# Patient Record
Sex: FEMALE | Race: WHITE | NOT HISPANIC OR LATINO | Employment: FULL TIME | URBAN - METROPOLITAN AREA
[De-identification: names, ages, dates, MRNs, and addresses within clinical notes are randomized per-mention and may not be internally consistent; named-entity substitution may affect disease eponyms.]

---

## 2017-03-10 ENCOUNTER — COMMUNICATION - HEALTHEAST (OUTPATIENT)
Dept: FAMILY MEDICINE | Facility: CLINIC | Age: 42
End: 2017-03-10

## 2017-03-10 DIAGNOSIS — F32.5 MAJOR DEPRESSION IN REMISSION (H): ICD-10-CM

## 2017-03-18 ENCOUNTER — OFFICE VISIT - HEALTHEAST (OUTPATIENT)
Dept: FAMILY MEDICINE | Facility: CLINIC | Age: 42
End: 2017-03-18

## 2017-03-18 DIAGNOSIS — J01.90 ACUTE SINUS INFECTION: ICD-10-CM

## 2017-06-07 ENCOUNTER — COMMUNICATION - HEALTHEAST (OUTPATIENT)
Dept: FAMILY MEDICINE | Facility: CLINIC | Age: 42
End: 2017-06-07

## 2017-06-07 DIAGNOSIS — F32.5 MAJOR DEPRESSION IN REMISSION (H): ICD-10-CM

## 2017-06-12 ENCOUNTER — COMMUNICATION - HEALTHEAST (OUTPATIENT)
Dept: FAMILY MEDICINE | Facility: CLINIC | Age: 42
End: 2017-06-12

## 2017-07-17 ENCOUNTER — OFFICE VISIT - HEALTHEAST (OUTPATIENT)
Dept: FAMILY MEDICINE | Facility: CLINIC | Age: 42
End: 2017-07-17

## 2017-07-17 DIAGNOSIS — F32.5 MAJOR DEPRESSION IN REMISSION (H): ICD-10-CM

## 2017-07-17 DIAGNOSIS — H92.03 EAR PAIN, BILATERAL: ICD-10-CM

## 2017-07-17 DIAGNOSIS — J01.90 ACUTE SINUSITIS: ICD-10-CM

## 2017-07-17 DIAGNOSIS — H69.90 EUSTACHIAN TUBE DYSFUNCTION: ICD-10-CM

## 2017-09-15 ENCOUNTER — OFFICE VISIT - HEALTHEAST (OUTPATIENT)
Dept: OTOLARYNGOLOGY | Facility: CLINIC | Age: 42
End: 2017-09-15

## 2017-09-15 ENCOUNTER — OFFICE VISIT - HEALTHEAST (OUTPATIENT)
Dept: AUDIOLOGY | Facility: CLINIC | Age: 42
End: 2017-09-15

## 2017-09-15 DIAGNOSIS — T70.0XXA BAROTRAUMA, OTIC, INITIAL ENCOUNTER: ICD-10-CM

## 2017-09-15 DIAGNOSIS — H92.03 OTALGIA, BILATERAL: ICD-10-CM

## 2018-06-06 ENCOUNTER — RECORDS - HEALTHEAST (OUTPATIENT)
Dept: ADMINISTRATIVE | Facility: OTHER | Age: 43
End: 2018-06-06

## 2018-06-18 ENCOUNTER — RECORDS - HEALTHEAST (OUTPATIENT)
Dept: ADMINISTRATIVE | Facility: OTHER | Age: 43
End: 2018-06-18

## 2018-06-22 ENCOUNTER — OFFICE VISIT - HEALTHEAST (OUTPATIENT)
Dept: FAMILY MEDICINE | Facility: CLINIC | Age: 43
End: 2018-06-22

## 2018-06-22 DIAGNOSIS — F32.5 MAJOR DEPRESSION IN REMISSION (H): ICD-10-CM

## 2018-06-22 ASSESSMENT — MIFFLIN-ST. JEOR: SCORE: 1255.75

## 2019-01-21 ENCOUNTER — OFFICE VISIT - HEALTHEAST (OUTPATIENT)
Dept: FAMILY MEDICINE | Facility: CLINIC | Age: 44
End: 2019-01-21

## 2019-01-21 DIAGNOSIS — S06.0X0A CONCUSSION WITHOUT LOSS OF CONSCIOUSNESS, INITIAL ENCOUNTER: ICD-10-CM

## 2019-01-21 DIAGNOSIS — S13.4XXA WHIPLASH INJURIES, INITIAL ENCOUNTER: ICD-10-CM

## 2019-01-21 DIAGNOSIS — S13.9XXA NECK SPRAIN, INITIAL ENCOUNTER: ICD-10-CM

## 2019-01-21 RX ORDER — ACETAMINOPHEN 500 MG
1000 TABLET ORAL EVERY 6 HOURS PRN
Qty: 100 TABLET | Refills: 2 | Status: SHIPPED | OUTPATIENT
Start: 2019-01-21 | End: 2022-12-14

## 2019-02-07 ENCOUNTER — RECORDS - HEALTHEAST (OUTPATIENT)
Dept: ADMINISTRATIVE | Facility: OTHER | Age: 44
End: 2019-02-07

## 2019-02-11 ENCOUNTER — RECORDS - HEALTHEAST (OUTPATIENT)
Dept: ADMINISTRATIVE | Facility: OTHER | Age: 44
End: 2019-02-11

## 2019-04-11 ENCOUNTER — OFFICE VISIT - HEALTHEAST (OUTPATIENT)
Dept: FAMILY MEDICINE | Facility: CLINIC | Age: 44
End: 2019-04-11

## 2019-04-11 DIAGNOSIS — R09.82 POST-NASAL DRIP: ICD-10-CM

## 2019-04-11 DIAGNOSIS — H69.92 DYSFUNCTION OF LEFT EUSTACHIAN TUBE: ICD-10-CM

## 2019-04-19 ENCOUNTER — HOSPITAL ENCOUNTER (OUTPATIENT)
Dept: CT IMAGING | Facility: HOSPITAL | Age: 44
Discharge: HOME | End: 2019-04-19
Attending: FAMILY MEDICINE

## 2019-04-19 DIAGNOSIS — H69.92 DYSFUNCTION OF LEFT EUSTACHIAN TUBE: ICD-10-CM

## 2019-04-19 DIAGNOSIS — R09.82 POST-NASAL DRIP: ICD-10-CM

## 2019-05-10 ENCOUNTER — AMBULATORY - HEALTHEAST (OUTPATIENT)
Dept: MULTI SPECIALTY CLINIC | Facility: CLINIC | Age: 44
End: 2019-05-10

## 2019-05-10 LAB — PAP SMEAR - HIM PATIENT REPORTED: NORMAL

## 2019-05-21 ENCOUNTER — OFFICE VISIT - HEALTHEAST (OUTPATIENT)
Dept: AUDIOLOGY | Facility: CLINIC | Age: 44
End: 2019-05-21

## 2019-05-21 ENCOUNTER — OFFICE VISIT - HEALTHEAST (OUTPATIENT)
Dept: OTOLARYNGOLOGY | Facility: CLINIC | Age: 44
End: 2019-05-21

## 2019-05-21 DIAGNOSIS — H92.02 OTALGIA, LEFT: ICD-10-CM

## 2019-05-21 DIAGNOSIS — H92.02 LEFT EAR PAIN: ICD-10-CM

## 2019-05-21 DIAGNOSIS — H93.8X2 SENSATION OF FULLNESS IN LEFT EAR: ICD-10-CM

## 2019-06-30 ENCOUNTER — COMMUNICATION - HEALTHEAST (OUTPATIENT)
Dept: FAMILY MEDICINE | Facility: CLINIC | Age: 44
End: 2019-06-30

## 2019-06-30 DIAGNOSIS — F32.5 MAJOR DEPRESSION IN REMISSION (H): ICD-10-CM

## 2019-07-17 ENCOUNTER — COMMUNICATION - HEALTHEAST (OUTPATIENT)
Dept: SCHEDULING | Facility: CLINIC | Age: 44
End: 2019-07-17

## 2019-07-17 DIAGNOSIS — F32.5 MAJOR DEPRESSION IN REMISSION (H): ICD-10-CM

## 2019-07-24 ENCOUNTER — OFFICE VISIT - HEALTHEAST (OUTPATIENT)
Dept: FAMILY MEDICINE | Facility: CLINIC | Age: 44
End: 2019-07-24

## 2019-07-24 DIAGNOSIS — H69.93 DYSFUNCTION OF BOTH EUSTACHIAN TUBES: ICD-10-CM

## 2019-07-24 DIAGNOSIS — F32.5 MAJOR DEPRESSION IN REMISSION (H): ICD-10-CM

## 2019-07-24 DIAGNOSIS — N93.9 ABNORMAL UTERINE BLEEDING (AUB): ICD-10-CM

## 2019-07-24 ASSESSMENT — MIFFLIN-ST. JEOR: SCORE: 1291.02

## 2019-12-05 ENCOUNTER — OFFICE VISIT - HEALTHEAST (OUTPATIENT)
Dept: FAMILY MEDICINE | Facility: CLINIC | Age: 44
End: 2019-12-05

## 2019-12-05 DIAGNOSIS — J01.10 ACUTE NON-RECURRENT FRONTAL SINUSITIS: ICD-10-CM

## 2019-12-05 DIAGNOSIS — Z23 ENCOUNTER FOR ADMINISTRATION OF VACCINE: ICD-10-CM

## 2019-12-20 ENCOUNTER — COMMUNICATION - HEALTHEAST (OUTPATIENT)
Dept: FAMILY MEDICINE | Facility: CLINIC | Age: 44
End: 2019-12-20

## 2019-12-20 ENCOUNTER — COMMUNICATION - HEALTHEAST (OUTPATIENT)
Dept: SCHEDULING | Facility: CLINIC | Age: 44
End: 2019-12-20

## 2019-12-30 ENCOUNTER — OFFICE VISIT - HEALTHEAST (OUTPATIENT)
Dept: AUDIOLOGY | Facility: CLINIC | Age: 44
End: 2019-12-30

## 2019-12-30 ENCOUNTER — OFFICE VISIT - HEALTHEAST (OUTPATIENT)
Dept: OTOLARYNGOLOGY | Facility: CLINIC | Age: 44
End: 2019-12-30

## 2019-12-30 DIAGNOSIS — H92.03 OTALGIA OF BOTH EARS: ICD-10-CM

## 2019-12-30 DIAGNOSIS — G44.209 TENSION HEADACHE: ICD-10-CM

## 2020-06-17 ENCOUNTER — COMMUNICATION - HEALTHEAST (OUTPATIENT)
Dept: FAMILY MEDICINE | Facility: CLINIC | Age: 45
End: 2020-06-17

## 2020-06-17 DIAGNOSIS — R09.82 POST-NASAL DRIP: ICD-10-CM

## 2020-06-17 DIAGNOSIS — H69.92 DYSFUNCTION OF LEFT EUSTACHIAN TUBE: ICD-10-CM

## 2020-07-07 ENCOUNTER — COMMUNICATION - HEALTHEAST (OUTPATIENT)
Dept: FAMILY MEDICINE | Facility: CLINIC | Age: 45
End: 2020-07-07

## 2020-07-07 DIAGNOSIS — F32.5 MAJOR DEPRESSION IN REMISSION (H): ICD-10-CM

## 2020-12-11 ENCOUNTER — OFFICE VISIT - HEALTHEAST (OUTPATIENT)
Dept: FAMILY MEDICINE | Facility: CLINIC | Age: 45
End: 2020-12-11

## 2020-12-11 DIAGNOSIS — F33.41 RECURRENT MAJOR DEPRESSIVE DISORDER, IN PARTIAL REMISSION (H): ICD-10-CM

## 2020-12-11 DIAGNOSIS — R09.82 POST-NASAL DRIP: ICD-10-CM

## 2020-12-11 DIAGNOSIS — R00.2 PALPITATIONS: ICD-10-CM

## 2020-12-11 LAB
ALBUMIN SERPL-MCNC: 3.9 G/DL (ref 3.5–5)
ALP SERPL-CCNC: 48 U/L (ref 45–120)
ALT SERPL W P-5'-P-CCNC: 15 U/L (ref 0–45)
ANION GAP SERPL CALCULATED.3IONS-SCNC: 10 MMOL/L (ref 5–18)
AST SERPL W P-5'-P-CCNC: 31 U/L (ref 0–40)
ATRIAL RATE - MUSE: 60 BPM
BILIRUB SERPL-MCNC: 0.7 MG/DL (ref 0–1)
BUN SERPL-MCNC: 10 MG/DL (ref 8–22)
CALCIUM SERPL-MCNC: 8.7 MG/DL (ref 8.5–10.5)
CHLORIDE BLD-SCNC: 105 MMOL/L (ref 98–107)
CO2 SERPL-SCNC: 24 MMOL/L (ref 22–31)
CREAT SERPL-MCNC: 0.8 MG/DL (ref 0.6–1.1)
DIASTOLIC BLOOD PRESSURE - MUSE: NORMAL
ERYTHROCYTE [DISTWIDTH] IN BLOOD BY AUTOMATED COUNT: 12.7 % (ref 11–14.5)
GFR SERPL CREATININE-BSD FRML MDRD: >60 ML/MIN/1.73M2
GLUCOSE BLD-MCNC: 85 MG/DL (ref 70–125)
HCT VFR BLD AUTO: 40.8 % (ref 35–47)
HGB BLD-MCNC: 13.5 G/DL (ref 12–16)
INTERPRETATION ECG - MUSE: NORMAL
MCH RBC QN AUTO: 31.2 PG (ref 27–34)
MCHC RBC AUTO-ENTMCNC: 33.1 G/DL (ref 32–36)
MCV RBC AUTO: 94 FL (ref 80–100)
P AXIS - MUSE: 60 DEGREES
PLATELET # BLD AUTO: 249 THOU/UL (ref 140–440)
PMV BLD AUTO: 9.3 FL (ref 8.5–12.5)
POTASSIUM BLD-SCNC: 4.4 MMOL/L (ref 3.5–5)
PR INTERVAL - MUSE: 164 MS
PROT SERPL-MCNC: 7.1 G/DL (ref 6–8)
QRS DURATION - MUSE: 82 MS
QT - MUSE: 456 MS
QTC - MUSE: 456 MS
R AXIS - MUSE: -4 DEGREES
RBC # BLD AUTO: 4.33 MILL/UL (ref 3.8–5.4)
SODIUM SERPL-SCNC: 139 MMOL/L (ref 136–145)
SYSTOLIC BLOOD PRESSURE - MUSE: NORMAL
T AXIS - MUSE: 18 DEGREES
TSH SERPL DL<=0.005 MIU/L-ACNC: 2.87 UIU/ML (ref 0.3–5)
VENTRICULAR RATE- MUSE: 60 BPM
WBC: 6 THOU/UL (ref 4–11)

## 2020-12-11 RX ORDER — SERTRALINE HYDROCHLORIDE 100 MG/1
100 TABLET, FILM COATED ORAL DAILY
Qty: 90 TABLET | Refills: 3 | Status: SHIPPED | OUTPATIENT
Start: 2020-12-11 | End: 2022-03-21

## 2020-12-11 ASSESSMENT — ANXIETY QUESTIONNAIRES
7. FEELING AFRAID AS IF SOMETHING AWFUL MIGHT HAPPEN: SEVERAL DAYS
5. BEING SO RESTLESS THAT IT IS HARD TO SIT STILL: NOT AT ALL
6. BECOMING EASILY ANNOYED OR IRRITABLE: NOT AT ALL
GAD7 TOTAL SCORE: 6
2. NOT BEING ABLE TO STOP OR CONTROL WORRYING: SEVERAL DAYS
3. WORRYING TOO MUCH ABOUT DIFFERENT THINGS: SEVERAL DAYS
1. FEELING NERVOUS, ANXIOUS, OR ON EDGE: MORE THAN HALF THE DAYS
IF YOU CHECKED OFF ANY PROBLEMS ON THIS QUESTIONNAIRE, HOW DIFFICULT HAVE THESE PROBLEMS MADE IT FOR YOU TO DO YOUR WORK, TAKE CARE OF THINGS AT HOME, OR GET ALONG WITH OTHER PEOPLE: SOMEWHAT DIFFICULT
4. TROUBLE RELAXING: SEVERAL DAYS

## 2020-12-11 ASSESSMENT — PATIENT HEALTH QUESTIONNAIRE - PHQ9: SUM OF ALL RESPONSES TO PHQ QUESTIONS 1-9: 2

## 2020-12-11 ASSESSMENT — MIFFLIN-ST. JEOR: SCORE: 1358.49

## 2020-12-11 NOTE — ASSESSMENT & PLAN NOTE
Increase sertraline to 100 mg daily. Elimination of alcohol use also recommended given depressive effects and potential effects on palpitations. Once mood well controlled limited use may be acceptable.

## 2020-12-14 LAB
25(OH)D3 SERPL-MCNC: 54.8 NG/ML (ref 30–80)
25(OH)D3 SERPL-MCNC: 54.8 NG/ML (ref 30–80)

## 2020-12-18 ENCOUNTER — OFFICE VISIT - HEALTHEAST (OUTPATIENT)
Dept: FAMILY MEDICINE | Facility: CLINIC | Age: 45
End: 2020-12-18

## 2020-12-18 ENCOUNTER — COMMUNICATION - HEALTHEAST (OUTPATIENT)
Dept: FAMILY MEDICINE | Facility: CLINIC | Age: 45
End: 2020-12-18

## 2020-12-18 DIAGNOSIS — F32.5 MAJOR DEPRESSION IN REMISSION (H): ICD-10-CM

## 2020-12-18 DIAGNOSIS — F33.41 RECURRENT MAJOR DEPRESSIVE DISORDER, IN PARTIAL REMISSION (H): ICD-10-CM

## 2020-12-18 DIAGNOSIS — F41.1 GENERALIZED ANXIETY DISORDER: ICD-10-CM

## 2020-12-18 DIAGNOSIS — M26.609 TMJ (TEMPOROMANDIBULAR JOINT SYNDROME): ICD-10-CM

## 2020-12-18 ASSESSMENT — MIFFLIN-ST. JEOR: SCORE: 1360.48

## 2020-12-18 NOTE — ASSESSMENT & PLAN NOTE
Continue physical therapy. Continued limited use of Norflex is reasonable. We will limit to 30 tabs every 3 months.

## 2020-12-18 NOTE — ASSESSMENT & PLAN NOTE
Patient already seeing improving with recent sertraline dose increase. She has also reduced alcohol intake. Continue current treatment.

## 2020-12-23 RX ORDER — SERTRALINE HYDROCHLORIDE 100 MG/1
100 TABLET, FILM COATED ORAL DAILY
Qty: 90 TABLET | Refills: 3 | Status: SHIPPED | OUTPATIENT
Start: 2020-12-23 | End: 2021-10-08

## 2021-02-10 ENCOUNTER — COMMUNICATION - HEALTHEAST (OUTPATIENT)
Dept: FAMILY MEDICINE | Facility: CLINIC | Age: 46
End: 2021-02-10

## 2021-02-10 DIAGNOSIS — R09.82 POST-NASAL DRIP: ICD-10-CM

## 2021-05-08 ENCOUNTER — RECORDS - HEALTHEAST (OUTPATIENT)
Dept: ADMINISTRATIVE | Facility: OTHER | Age: 46
End: 2021-05-08

## 2021-05-27 ASSESSMENT — PATIENT HEALTH QUESTIONNAIRE - PHQ9: SUM OF ALL RESPONSES TO PHQ QUESTIONS 1-9: 2

## 2021-05-27 NOTE — PROGRESS NOTES
ASSESSMENT/PLAN:  1. Dysfunction of left eustachian tube  43-year-old female with acute exacerbation of some long-standing issues.  Certainly at face value the left ear and sinus pain seems consistent with eustachian tube dysfunction.  However this is been recurrent for her and she also describes some nasal drainage always on the left.  I would like to order a CT of the sinus as well as get her started on Flonase.  Because her ear pain is been so severe this is been ongoing for many years, I will ask her to see ENT for further evaluation.  - fluticasone propionate (FLONASE ALLERGY RELIEF) 50 mcg/actuation nasal spray; 2 sprays into each nostril daily.  Dispense: 16 g; Refill: 12  - CT Sinuses Without Contrast; Future  - Ambulatory referral to ENT    2. Post-nasal drip  - fluticasone propionate (FLONASE ALLERGY RELIEF) 50 mcg/actuation nasal spray; 2 sprays into each nostril daily.  Dispense: 16 g; Refill: 12  - CT Sinuses Without Contrast; Future      Patient Instructions   CT Sinus ordered; someone will call you to help you schedule this.    Try Flonase nasal spray twice daily.    Consider taking naproxen twice daily for the next 3 days.  - Make sure you take this with food.    Referral for ENT is placed; someone will call you to help you schedule this.       Orders Placed This Encounter   Procedures     CT Sinuses Without Contrast     Standing Status:   Future     Standing Expiration Date:   4/11/2020     Order Specific Question:   Reason for Exam (Describe Symptoms):     Answer:   left sinus pain, chronic     Order Specific Question:   Is the patient pregnant?     Answer:   No     Order Specific Question:   Can the procedure be changed per Radiologist protocol?     Answer:   Yes     Order Specific Question:   If this is a diagnostic procedure, have the patient's age and recent imaging history been considered?     Answer:   Yes     Ambulatory referral to ENT     Referral Priority:   Routine     Referral Type:    Consultation     Referral Reason:   Evaluation and Treatment     Requested Specialty:   Otolaryngology     Number of Visits Requested:   1     Medications Discontinued During This Encounter   Medication Reason     cyclobenzaprine (FLEXERIL) 5 MG tablet Therapy completed       Return in about 6 months (around 10/11/2019) for annual physical with PCP, or sooner as needed.    CHIEF COMPLAINT;  Chief Complaint   Patient presents with     Ear Pain     Left ear with drainage       HISTORY OF PRESENT ILLNESS:  Amee is a 43 y.o. female presenting to the clinic today for ear pain. She woke up yesterday and threw up around 4 AM. She stayed home from work yesterday because she was not feeling well. She started to have extreme left-sided jaw pain at 3 PM yesterday. The discomfort would improve if she touched her left ear. She took naproxen and found some relief. She is still having quite a bit of pressure in her left jaw and ear. She has had some nasal drainage for the past month. She does have some postnasal drip when she sleeps. She gets quite a bit of dizziness and ear pain when she flies. Of note, she has previously seen Dr. Aranda with ENT for ear pain and dizziness with flights.     REVIEW OF SYSTEMS:  She wears glasses. All other systems are negative.    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 She went to [place] in the beginning of March.     TOBACCO USE:  Social History     Tobacco Use   Smoking Status Never Smoker   Smokeless Tobacco Never Used       VITALS:  Vitals:    04/11/19 1125 04/11/19 1127   BP: 112/70    Patient Site: Right Arm    Patient Position: Sitting    Cuff Size: Adult Regular    Pulse: (!) 57 (!) 59   SpO2: 99%    Weight: 140 lb 8 oz (63.7 kg)      Wt Readings from Last 3 Encounters:   04/11/19 140 lb 8 oz (63.7 kg)   01/21/19 143 lb (64.9 kg)   06/22/18 135 lb 1.6 oz (61.3 kg)     Body mass index is 23.74 kg/m .    PHYSICAL EXAM:  GENERAL APPEARANCE:  Alert, cooperative, no distress, appears stated age  HEAD: Normocephalic, without obvious abnormality, atraumatic  EYES:  PERRL, conjunctiva/corneas clear, EOM's intact, fundi     benign, both eyes       EARS: Normal TM's and external ear canals, both ears  NOSE:  Nares normal, septum midline, mucosa normal, no drainage or sinus tenderness  THROAT: Lips, mucosa, and tongue normal; teeth and gums normal  NEUROLOGIC: CNII-XII intact.     RECENT RESULTS  No results found for this or any previous visit (from the past 48 hour(s)).    ADDITIONAL HISTORY SUMMARIZED (2): None.  DECISION TO OBTAIN EXTRA INFORMATION (1): None.  RADIOLOGY TESTS (1): CT Sinuses ordered.  LABS (1): None.  MEDICINE TESTS (1): None.  INDEPENDENT REVIEW (2 each): None.    The visit lasted a total of 12 minutes face to face with the patient. Over 50% of the time was spent counseling and educating the patient about jaw pain.    IAnabel, am scribing for and in the presence of, Dr. Burnett.    I, Dr. Burnett, personally performed the services described in this documentation, as scribed by Anabel Lujan in my presence, and it is both accurate and complete.    Dragon dictation was used for this note.  Speech recognition errors are a possibility.    MEDICATIONS:  Current Outpatient Medications   Medication Sig Dispense Refill     acetaminophen (TYLENOL EXTRA STRENGTH) 500 MG tablet Take 2 tablets (1,000 mg total) by mouth every 6 (six) hours as needed for pain. 100 tablet 2     naproxen (NAPROSYN) 500 MG tablet Take 1 tablet (500 mg total) by mouth 2 (two) times a day with meals. 60 tablet 2     sertraline (ZOLOFT) 50 MG tablet TAKE ONE TABLET BY MOUTH ONE TIME DAILY 90 tablet 3     fluticasone propionate (FLONASE ALLERGY RELIEF) 50 mcg/actuation nasal spray 2 sprays into each nostril daily. 16 g 12     No current facility-administered medications for this visit.        Total data points: 1

## 2021-05-27 NOTE — PATIENT INSTRUCTIONS - HE
CT Sinus ordered; someone will call you to help you schedule this.    Try Flonase nasal spray twice daily.    Consider taking naproxen twice daily for the next 3 days.  - Make sure you take this with food.    Referral for ENT is placed; someone will call you to help you schedule this.

## 2021-05-28 ASSESSMENT — ANXIETY QUESTIONNAIRES: GAD7 TOTAL SCORE: 6

## 2021-05-29 NOTE — PROGRESS NOTES
HPI: She coms in today with sever left ear pain for 1 year, although better after she stopped playing the violin for 3 months because of a shoulder injury.  She notes her pain radiates down her jaw.  She has history of upper braces put on by her dentist.      Past medical history, surgical history, social history, family history, medications, and allergies have been reviewed with the patient and are documented above.    Review of Systems: a 10-system review was performed. Pertinent positives are noted in the HPI and on a separate scanned document in the chart.    PHYSICAL EXAMINATION:  GEN: no acute distress, normocephalic  EYES: extraocular movements are intact, pupils are equal and round. Sclera clear.   EARS: auricles are normally formed. The external auditory canals are clear with minimal to no cerumen. Tympanic membranes are intact bilaterally with no signs of infection, effusion, retractions, or perforations.  NEURO: CN II-XII are intact bilaterally. alert and oriented. No nystagmus. Gait is normal.  PULM: breathing comfortably on room air, normal chest expansion with respiration  HEART: regular rate and rhythm, no peripheral edema    AUDIOGRAM: Normal hearing, Normal tympanograms    MEDICAL DECISION-MAKING: Likely referred TMJ pain. She will bring up with her dentis this week, if not we can get her evaluation with oral surgery.  Follow up PRN.

## 2021-05-30 VITALS — WEIGHT: 158.6 LBS | BODY MASS INDEX: 26.8 KG/M2

## 2021-05-30 NOTE — PROGRESS NOTES
Assessment/Plan:     1. Major depression in remission (H)  sertraline (ZOLOFT) 50 MG tablet   2. Abnormal uterine bleeding (AUB)     3. Dysfunction of both eustachian tubes         Diagnoses and all orders for this visit:    Major depression in remission (H)  -     sertraline (ZOLOFT) 50 MG tablet; TAKE 1 TABLET BY MOUTH EVERY DAY  Dispense: 90 tablet; Refill: 3  -She is doing well on current dose.  This will be continued.  Refill provided.    Abnormal uterine bleeding (AUB)  Had recent endometrial biopsy, pelvic ultrasound and Pap smear that were all normal.  She is planning to proceed with Mirena IUD placement with her gynecologist    Dysfunction of both eustachian tubes  She will continue fluticasone nasal spray.  She uses Afrin and Sudafed when flying.  She will see TMJ specialist.               I have had an Advance Directives discussion with the patient.  The following are part of a depression follow up plan for the patient:  mental health care management    Subjective:      Amee Nettles is a 44 y.o. female who comes into clinic today for follow-up on depression.  She has not had depression checkup in over a year.  She continues on sertraline 50 mg daily.  She continues to do well with this medication and this dose.  She really is not bothered by any significant symptoms of anxiety or depression.  She does feel a little bit dizzy and funny if she misses a dose of the medication.  She is not interested in trying to reduce the dosage or titrate off at this time.  We reviewed her interim health history.  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  She also enjoys playing the violin.  She has some chronic sinus issues and eustachian tube dysfunction.  Recently was seen by ENT specialist and it was felt that a lot of her pain in the jaw and sinuses was actually due to TMJ dysfunction.  She has not been playing her violin as much and her symptoms have not been as severe.  She did get  a referral to a TMJ specialist by her dentist and she will plan to schedule an appointment.  She continues on fluticasone nasal spray.  When she flies she also uses Afrin and Sudafed.  She recently saw a gynecologist for concern about heavy menstrual bleeding.  She underwent pelvic ultrasound, Pap smear and endometrial biopsy.  These were all normal.  She was advised to have a Mirena IUD placed.  She did have a Mirena in the past but experienced a lot of cramping with IUD.  The cramping resolved when she had it removed.  She is thinking she will try the brain again but request to have a placed under ultrasound guidance.  We reviewed medications and allergies and updated the chart.  States that she recently had a living will made with a  and will bring in a copy for her chart.  No other concerns or questions today.    Current Outpatient Medications   Medication Sig Dispense Refill     acetaminophen (TYLENOL EXTRA STRENGTH) 500 MG tablet Take 2 tablets (1,000 mg total) by mouth every 6 (six) hours as needed for pain. 100 tablet 2     fluticasone propionate (FLONASE ALLERGY RELIEF) 50 mcg/actuation nasal spray 2 sprays into each nostril daily. 16 g 12     naproxen (NAPROSYN) 500 MG tablet Take 1 tablet (500 mg total) by mouth 2 (two) times a day with meals. 60 tablet 2     sertraline (ZOLOFT) 50 MG tablet TAKE 1 TABLET BY MOUTH EVERY DAY 90 tablet 3     No current facility-administered medications for this visit.        Past Medical History, Family History, and Social History reviewed.  Past Medical History:   Diagnosis Date     Major Depression, Single Episode     Created by Conversion      No past surgical history on file.  Patient has no known allergies.  Family History   Problem Relation Age of Onset     Hypertension Mother      Hypertension Father      Pulmonary embolism Father      Clotting disorder Father      Pulmonary embolism Paternal Grandfather      Social History     Socioeconomic History     Marital  "status:      Spouse name: Not on file     Number of children: Not on file     Years of education: Not on file     Highest education level: Not on file   Occupational History     Not on file   Social Needs     Financial resource strain: Not on file     Food insecurity:     Worry: Not on file     Inability: Not on file     Transportation needs:     Medical: Not on file     Non-medical: Not on file   Tobacco Use     Smoking status: Never Smoker     Smokeless tobacco: Never Used   Substance and Sexual Activity     Alcohol use: Yes     Alcohol/week: 8.4 oz     Types: 14 Glasses of wine per week     Drug use: No     Sexual activity: Never   Lifestyle     Physical activity:     Days per week: Not on file     Minutes per session: Not on file     Stress: Not on file   Relationships     Social connections:     Talks on phone: Not on file     Gets together: Not on file     Attends Mandaen service: Not on file     Active member of club or organization: Not on file     Attends meetings of clubs or organizations: Not on file     Relationship status: Not on file     Intimate partner violence:     Fear of current or ex partner: Not on file     Emotionally abused: Not on file     Physically abused: Not on file     Forced sexual activity: Not on file   Other Topics Concern     Not on file   Social History Narrative     Not on file         Review of systems is as stated in HPI, and the remainder of the 10 system review is otherwise negative.    Objective:     Vitals:    07/24/19 0832   BP: 112/70   Patient Site: Left Arm   Patient Position: Sitting   Cuff Size: Adult Regular   Pulse: 66   Temp: 97.8  F (36.6  C)   TempSrc: Tympanic   SpO2: 98%   Weight: 142 lb (64.4 kg)   Height: 5' 4.75\" (1.645 m)    Body mass index is 23.81 kg/m .    General appearance: alert, appears stated age and cooperative  Head: Normocephalic, without obvious abnormality, atraumatic  Neurologic: Grossly normal  Psych: mood appropriate, affect " normal    Little interest or pleasure in doing things: Not at all  Feeling down, depressed, or hopeless: Not at all  Trouble falling or staying asleep, or sleeping too much: Not at all  Feeling tired or having little energy: Not at all  Poor appetite or overeating: Not at all  Feeling bad about yourself - or that you are a failure or have let yourself or your family down: Not at all  Trouble concentrating on things, such as reading the newspaper or watching television: Not at all  Moving or speaking so slowly that other people could have noticed. Or the opposite - being so fidgety or restless that you have been moving around a lot more than usual: Not at all  Thoughts that you would be better off dead, or of hurting yourself in some way: Not at all  PHQ-9 Total Score: 0    REBECCA-7: 0      This note has been dictated using voice recognition software. Any grammatical or context distortions are unintentional and inherent to the the software.

## 2021-05-30 NOTE — TELEPHONE ENCOUNTER
Refill Approved    Rx renewed per Medication Renewal Policy. Medication was last renewed on 6/22/18  #90 R-3.    Last OV 4/11/19    Elsie Rios, [facility] Connection Triage/Med Refill 6/30/2019     Requested Prescriptions   Pending Prescriptions Disp Refills     sertraline (ZOLOFT) 50 MG tablet [Pharmacy Med Name: SERTRALINE HCL 50 MG TABLET] 90 tablet 0     Sig: TAKE 1 TABLET BY MOUTH EVERY DAY       [facility] Refill Protocol  Passed - 6/30/2019 12:30 AM        Passed - PCP or prescribing provider visit in last year     Last office visit with prescriber/PCP: 6/22/2018 Ryan Gallo MD OR same dept: 4/11/2019 Charley Burnett MD OR same specialty: 4/11/2019 Charley Burnett MD  Last physical: Visit date not found Last MTM visit: Visit date not found   Next visit within 3 mo: Visit date not found  Next physical within 3 mo: Visit date not found  Prescriber OR PCP: Ryan Gallo MD  Last diagnosis associated with med order: 1. Major depression in remission (H)  - sertraline (ZOLOFT) 50 MG tablet [Pharmacy Med Name: SERTRALINE HCL 50 MG TABLET]; TAKE 1 TABLET BY MOUTH EVERY DAY  Dispense: 90 tablet; Refill: 0    If protocol passes may refill for 12 months if within 3 months of last provider visit (or a total of 15 months).

## 2021-05-30 NOTE — TELEPHONE ENCOUNTER
Medication Question or Clarification  Who is calling: Patient  What medication are you calling about? Sertraline 50mg   Who prescribed the medication?: Dr. Luna  What is your question/concern?: Patient would like to be seen soon sooner for initial visit to be able to refill her medication. If this can't be done she would like to get some to hold her over until 7/24 appointment. Patient only has one left.   Okay to leave a detailed message?: Yes  Site CMT - Please call the pharmacy to obtain any additional needed information.

## 2021-05-31 VITALS — WEIGHT: 145 LBS | BODY MASS INDEX: 24.5 KG/M2

## 2021-06-01 VITALS — HEIGHT: 65 IN | BODY MASS INDEX: 22.51 KG/M2 | WEIGHT: 135.1 LBS

## 2021-06-02 VITALS — WEIGHT: 143 LBS | BODY MASS INDEX: 24.17 KG/M2

## 2021-06-02 VITALS — WEIGHT: 140.5 LBS | BODY MASS INDEX: 23.74 KG/M2

## 2021-06-03 VITALS — WEIGHT: 142 LBS | BODY MASS INDEX: 23.66 KG/M2 | HEIGHT: 65 IN

## 2021-06-04 VITALS
HEART RATE: 53 BPM | TEMPERATURE: 97.9 F | WEIGHT: 144.44 LBS | SYSTOLIC BLOOD PRESSURE: 120 MMHG | BODY MASS INDEX: 24.22 KG/M2 | DIASTOLIC BLOOD PRESSURE: 80 MMHG

## 2021-06-04 NOTE — PROGRESS NOTES
Assessment/Plan:    1. Acute non-recurrent frontal sinusitis  Pt evidence of otitis media on today's exam but suspicious for sinus infection given symptoms and duration of illness. Recommend treatment with augmentin as below. Also discussed continuing flonase nasal spray, stopping sudafed, adequate hydration and rest, tylenol/ibuprofen as needed. Follow up as needed.  - amoxicillin-clavulanate (AUGMENTIN) 875-125 mg per tablet; Take 1 tablet by mouth 2 (two) times a day for 7 days.  Dispense: 14 tablet; Refill: 0    2. Encounter for administration of vaccine  Due for Td and flu shot, administered today  - Td, Preservative Free (green label)  - Influenza,Seasonal,Quad,INJ =/>6months      Follow up: as needed    Roseline Martin MD  [facility]    Subjective:    Patient ID: Amee Nettles is a 44 y.o. female is here today for ear pain    Ear pain  -started approx 1 month ago - ear pressure bilaterally, popping, also frontal sinus pressure bilaterally, postnasal drip, nasal congestion  -using flonase and sudafed  -muffled hearing bilaterally  -no fevers  -feeling fatigue  -not improving over 4 weeks and actually was feeling worse yesterday      Patient Active Problem List   Diagnosis     Supervision of normal first pregnancy     Postpartum mental disorders of mother     Postpartum care and examination of lactating mother     Normal delivery     Anxiety state     Past Medical History:   Diagnosis Date     Major Depression, Single Episode     Created by Conversion      History reviewed. No pertinent surgical history.  Current Outpatient Medications on File Prior to Visit   Medication Sig Dispense Refill     fluticasone propionate (FLONASE ALLERGY RELIEF) 50 mcg/actuation nasal spray 2 sprays into each nostril daily. 16 g 12     sertraline (ZOLOFT) 50 MG tablet TAKE 1 TABLET BY MOUTH EVERY DAY 90 tablet 3     acetaminophen (TYLENOL EXTRA STRENGTH) 500 MG tablet Take 2 tablets (1,000 mg total) by mouth every  6 (six) hours as needed for pain. 100 tablet 2     naproxen (NAPROSYN) 500 MG tablet Take 1 tablet (500 mg total) by mouth 2 (two) times a day with meals. 60 tablet 2     No current facility-administered medications on file prior to visit.      No Known Allergies  Social History     Socioeconomic History     Marital status:      Spouse name: Not on file     Number of children: Not on file     Years of education: Not on file     Highest education level: Not on file   Occupational History     Not on file   Social Needs     Financial resource strain: Not on file     Food insecurity:     Worry: Not on file     Inability: Not on file     Transportation needs:     Medical: Not on file     Non-medical: Not on file   Tobacco Use     Smoking status: Never Smoker     Smokeless tobacco: Never Used   Substance and Sexual Activity     Alcohol use: Yes     Alcohol/week: 14.0 standard drinks     Types: 14 Glasses of wine per week     Drug use: No     Sexual activity: Never   Lifestyle     Physical activity:     Days per week: Not on file     Minutes per session: Not on file     Stress: Not on file   Relationships     Social connections:     Talks on phone: Not on file     Gets together: Not on file     Attends Scientologist service: Not on file     Active member of club or organization: Not on file     Attends meetings of clubs or organizations: Not on file     Relationship status: Not on file     Intimate partner violence:     Fear of current or ex partner: Not on file     Emotionally abused: Not on file     Physically abused: Not on file     Forced sexual activity: Not on file   Other Topics Concern     Not on file   Social History Narrative     Not on file     Family History   Problem Relation Age of Onset     Hypertension Mother      Hypertension Father      Pulmonary embolism Father      Clotting disorder Father      Pulmonary embolism Paternal Grandfather      Review of systems is as stated in HPI, and the remainder of  system review is otherwise negative.    Objective:      /80   Pulse (!) 53   Temp 97.9  F (36.6  C)   Wt 144 lb 7 oz (65.5 kg)   BMI 24.22 kg/m      General appearance: awake, NAD  HEENT: atraumatic, normocephalic, PERRL, no scleral icterus or injection, TMs with clear effusion bilaterally but no erythema or bulging, no significant erythema of posterior oropharynx, moist mucous membranes  Neck: supple, no lymphadenopathy, normal ROM  CV: RRR, no murmurs/rubs/gallops, normal S1 and S2  Lungs: CTAB, no wheezes or crackles, breathing comfortably on room air  Extremities: moving all extremities  Neuro: alert, oriented x3, CNs grossly intact, no focal deficits appreciated  Psych: normal mood/affect/behavior, answering questions appropriately, linear thought process

## 2021-06-04 NOTE — TELEPHONE ENCOUNTER
Pt was notified of recommendations from Dr. Luna.   Pt will go to ED for evaluation.   Selina Ahmadi, RN   [facility] RN Triage

## 2021-06-04 NOTE — PATIENT INSTRUCTIONS - HE
Continue flonase  Stop sudafed  Lots of water and rest  Start empiric augmentin for sinus infection - 7 days

## 2021-06-04 NOTE — TELEPHONE ENCOUNTER
RN triage   Call from pt   Pt states seen 2+ weeks ago and amox for sinus infection -- felt better   For the past 4 days feeling worse--  Symptoms returned   Lots of pain behind nose -- and 'severe ' headache -- feels like bad migraine  Feeling hot and sweaty -- no thermometer  Breathing OK   Minimal nasal congestion - no stuffy or runny nose   Using rhett pot   Reviewed home care advice   Per protocol = should go to ED - check w/ PCP first   Please advise = when and where do you want pt seen   Angi Palacios RN [facility] RN triage          Reason for Disposition    SEVERE headache and has fever    Protocols used: SINUS PAIN AND CONGESTION-A-[place]

## 2021-06-04 NOTE — PROGRESS NOTES
"HISTORY OF PRESENT ILLNESS  Patient comes in for evaluation of ear pain. Patient reports that she is dealing with ear pressure. She has been back and forth with \"inner ear and sinus stuff.\" Symptoms include pressure behind the eyes and forehead. Pressure on the side of the head and jaw pain on the left side of the head. She feels shifting pressure in the face. Symptoms come and go. She had \"incredible pain and pressure\" which led her to the ED. The pain was treated. She was worried about a stroke. 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 She has had recommendation by her dentist. She feels that the Augmentin helped but her symptoms had come back after that.     REVIEW OF SYSTEMS  Review of Systems: a 10-system review was performed. Pertinent positives are noted in the HPI and on a separate scanned document in the chart.    PMH, PSH, FH and SH has documented in the EHR.      EXAM    CONSTITUTIONAL  General Appearance:  Normal, well developed, well nourished, no obvious distress  Ability to Communicate:  communicates appropriately.    HEAD AND FACE  Appearance and Symmetry:  Normal, no scalp or facial scarring or suspicious lesions.  Paranasal sinuses tenderness:  Normal, Paranasal sinuses non tender    EARS  Clinical speech reception threshold:  Normal, able to hear normal speech.  Auricle:  Normal, Auricles without scars, lesions, masses.  External auditory canal:  Normal, External auditory canal normal.  Tympanic membrane:  Normal, Tympanic membranes normal without swelling or erythema.  Tympanic membrane mobility:  Normal, Normal tympanic membrane mobility.    NOSE (speculum or scope)  Architecture:  Normal, Grossly normal external nasal architecture with no masses or lesions.  Mucosa:  Normal mucosa, No polyps or masses.  Septum:  Normal, Septum non-obstructing.  Turbinates:  Normal, No turbinate abnormalities    ORAL CAVITY AND OROPHARYNX  Lips:  Normal.  Dental and gingiva:  Normal, No obvious dental " or gingival disease.  Mucosa:  Normal, Moist mucous membranes.  Tongue:  Normal, Tongue mobile with no mucosal abnormalities  Hard and soft palate:  Normal, Hard and soft palate without cleft or mucosal lesions.  Oral pharynx:  Normal, Posterior pharynx without lesions or remarkable asymmetry.  Saliva:  Normal, Clear saliva.  Masses:  Normal, No palpable masses or pathologically enlarged lymph nodes.    NECK  Masses/lymph nodes:  Normal, No worrisome neck masses or lymph nodes.  Salivary glands:  Normal, Parotid and submandibular glands.  Trachea and larynx position:  Normal, Trachea and larynx midline.  Thyroid:  Normal, No thyroid abnormality.  Tenderness:  Normal, No cervical tenderness.  Suppleness:  Normal, Neck supple    NEUROLOGICAL  Speech pattern:  Normal, Proasaic    RESPIRATORY  Symmetry and Respiratory effort:  Normal, Symmetric chest movement and expansion with no increased intercostal retractions or use of accessory muscles.     IMPRESSION  Symptoms are consistent with musculoskeletal pain. No evidence of nasal or sinus pathology today. I reviewed her prior CT scan that was negative. I also reviewed prior notes and discussed the results with the patient.     RECOMMENDATION  I advised [facility]. I provided her with the contact information. She is going to think about.     Chandler Trevino MD

## 2021-06-05 VITALS
RESPIRATION RATE: 20 BRPM | HEART RATE: 60 BPM | SYSTOLIC BLOOD PRESSURE: 122 MMHG | DIASTOLIC BLOOD PRESSURE: 80 MMHG | TEMPERATURE: 98.1 F | HEIGHT: 65 IN | WEIGHT: 156 LBS | BODY MASS INDEX: 25.99 KG/M2

## 2021-06-05 VITALS
WEIGHT: 156.44 LBS | TEMPERATURE: 98.2 F | BODY MASS INDEX: 26.06 KG/M2 | SYSTOLIC BLOOD PRESSURE: 130 MMHG | HEART RATE: 64 BPM | RESPIRATION RATE: 16 BRPM | DIASTOLIC BLOOD PRESSURE: 82 MMHG | HEIGHT: 65 IN

## 2021-06-09 NOTE — TELEPHONE ENCOUNTER
Refill Approved    Rx renewed per Medication Renewal Policy. Medication was last renewed on 7/24/19.    Tahira Navarro, [facility] Connection Triage/Med Refill 7/9/2020     Requested Prescriptions   Pending Prescriptions Disp Refills     sertraline (ZOLOFT) 50 MG tablet [Pharmacy Med Name: SERTRALINE HCL 50 MG TABLET] 90 tablet 3     Sig: TAKE 1 TABLET BY MOUTH EVERY DAY       [facility] Refill Protocol  Passed - 7/9/2020  9:31 AM        Passed - PCP or prescribing provider visit in last year     Last office visit with prescriber/PCP: 6/22/2018 Ryan Gallo MD OR same dept: 12/5/2019 Roseline Martin MD OR same specialty: 12/5/2019 Roseline Martin MD  Last physical: Visit date not found Last MTM visit: Visit date not found   Next visit within 3 mo: Visit date not found  Next physical within 3 mo: Visit date not found  Prescriber OR PCP: Ryan Gallo MD  Last diagnosis associated with med order: 1. Major depression in remission (H)  - sertraline (ZOLOFT) 50 MG tablet [Pharmacy Med Name: SERTRALINE HCL 50 MG TABLET]; TAKE 1 TABLET BY MOUTH EVERY DAY  Dispense: 90 tablet; Refill: 3    If protocol passes may refill for 12 months if within 3 months of last provider visit (or a total of 15 months).

## 2021-06-09 NOTE — PROGRESS NOTES
"Subjective:      Patient ID: Amee Nettles is a 41 y.o. female.    Chief Complaint:    HPI Amee Nettles is a 41 y.o. female who presents today complaining of x sinus and eye pressure x 9 days.  Patient began having bilateral eye pain, discharge, photophobia 10 days ago.  She was seen by her eye doctor and diagnosed with conjunctivitis. Patient was prescribed Neomycin B and Dexamelthosone drops for the treatment of conjunctivitis.  A few days later she started having cold symptoms.  She reports having a large amount of sinus pressure and pressure behind her eyes.  She also reports sneezing, coughing, sore throat, and ear pressure.  Patient states \"it feels like I am hit by a truck \".  Patient has had a sinus infection in the past. Patient has a productive cough with green mucus.  She took Mucinex and Sudafed with some improvement. Patient is a teacher, and has many frequent sick contacts.  She is concerned because she is going on a trip to [place] on Tuesday, she tends to have a lot of ear pain during flight.      Past Medical History:   Diagnosis Date     Major Depression, Single Episode     Created by Conversion        No past surgical history on file.    Family History   Problem Relation Age of Onset     Hypertension Mother      Hypertension Father      Pulmonary embolism Father        Social History   Substance Use Topics     Smoking status: Never Smoker     Smokeless tobacco: Not on file     Alcohol use 8.4 oz/week     14 Glasses of wine per week       Review of Systems   Constitutional: Positive for fatigue. Negative for fever.   HENT: Positive for congestion, ear pain, sinus pressure, sneezing and sore throat.    Respiratory: Positive for cough.    Neurological: Positive for headaches.       Objective:     Visit Vitals     /84 (Patient Site: Right Arm, Patient Position: Sitting, Cuff Size: Adult Regular)     Pulse 63     Temp 98.3  F (36.8  C) (Oral)     Resp 12     Wt 158 lb 9.6 oz (71.9 kg)     " LMP 02/22/2017 (Approximate)     SpO2 99%     BMI 26.8 kg/m2       Physical Exam   Constitutional: She appears well-developed and well-nourished. No distress.   HENT:   Right Ear: Tympanic membrane normal.   Left Ear: Tympanic membrane normal.   Nose: Mucosal edema present. No sinus tenderness or nasal deformity. Right sinus exhibits maxillary sinus tenderness and frontal sinus tenderness. Left sinus exhibits maxillary sinus tenderness and frontal sinus tenderness.   Mouth/Throat: Uvula is midline. Posterior oropharyngeal erythema present. No oropharyngeal exudate, posterior oropharyngeal edema or tonsillar abscesses.   Eyes: Conjunctivae are normal.   Neck: Normal range of motion. Neck supple.   Cardiovascular: Normal rate, regular rhythm and normal heart sounds.    No murmur heard.  Pulmonary/Chest: Effort normal and breath sounds normal. No respiratory distress. She has no wheezes. She has no rales.   Lymphadenopathy:     She has no cervical adenopathy.       Procedures    Assessment / Plan:     1. Acute sinus infection  oxymetazoline (AFRIN) 0.05 % nasal spray    fluticasone (FLONASE) 50 mcg/actuation nasal spray    amoxicillin-clavulanate (AUGMENTIN) 875-125 mg per tablet         Patient Instructions   1) Take Augmentin twice daily for 10 days for the treatment of your bacterial sinus infection.  2) Use Flonase (fluticasone) 1 spray per nostril once daily. You can start this immediately.   3) Use Afrin three sprays in each nostril 2 times per day starting the day before you fly, and stop the day that you fly. Do not use Afrin for more than three days at a time. It should be ok to repeat the use on your flight home. Also trying to chew gum during the flight may help with decompression pain.   4) Use neti pot or saline spray for sinus rinses.   5) Continue taking Mucinex to thin the mucus in the sinus tracts and allow it to be blown out easier.   6) Return if symptoms worsen or do not improve in 7-10 days.

## 2021-06-09 NOTE — TELEPHONE ENCOUNTER
Refill Request  Did you contact pharmacy: No  Medication name:   Requested Prescriptions     Pending Prescriptions Disp Refills     sertraline (ZOLOFT) 50 MG tablet [Pharmacy Med Name: SERTRALINE HCL 50 MG TABLET] 90 tablet 3     Sig: TAKE 1 TABLET BY MOUTH EVERY DAY     Who prescribed the medication: Ines Luna MD  Requested Pharmacy: [facility]  Is patient out of medication: Yes  Patient notified refills processed in 3 business days:  yes  Okay to leave a detailed message: no

## 2021-06-11 NOTE — PROGRESS NOTES
Assessment/Plan:     1. Major depression in remission  sertraline (ZOLOFT) 50 MG tablet   2. Eustachian tube dysfunction  Ambulatory referral to ENT   3. Ear pain, bilateral  Ambulatory referral to ENT   4. Acute sinusitis         Diagnoses and all orders for this visit:    Eustachian tube dysfunction  -     Ambulatory referral to ENT  -Encouraged use of fluticasone spray    Major depression in remission  -     sertraline (ZOLOFT) 50 MG tablet; TAKE ONE TABLET BY MOUTH ONE TIME DAILY  Dispense: 90 tablet; Refill:   -Doing well on sertraline.  Continue 50 mg daily    Ear pain, bilateral  -     Ambulatory referral to ENT    Acute sinusitis    -We will treat with Augmentin twice daily for 10 days.  Counseled on use of medication and side effects.  Suggested fluticasone spray, saline nasal rinses or [employer] pot.  Also suggested Mucinex.  Follow-up if symptoms worsening or not improving with these measures.      Other orders  -     amoxicillin-clavulanate (AUGMENTIN) 875-125 mg per tablet; Take 1 tablet by mouth 2 (two) times a day for 10 days.  Dispense: 20 tablet; Refill: 0               Subjective:      Amee Nettles is a 42 y.o. female who comes in to follow-up on depression.  She also has concerns about possible sinus infection.  She was last seen in January 2016.  At that time we reduced her dosage of sertraline to 50 mg daily.  She has been doing well since this dosage adjustment.  Feels that depression is manageable.  Would like to continue on current dose because she has taken a new position at work and her  will be returning to the office instead of staying at home so she is anticipating some lifestyle adjustments.  If things go well, she may consider attempting to reduce the dosage again in the future.  She does need a refill.  She reports that she has been having some pain and pressure in the right temporal region.  She had a cold that started in early July.  She took Sudafed which was helpful and  she seemed to get better but then symptoms returned again.  She has continued to have fluctuating symptoms.  Some days she will be feeling better and other days she will be feeling worse.  When she massages the right temporal region, she will get drainage in her throat.  Also feels mucus stuck in the back of her throat.  Mucus is green.  She has a little bit of rhinorrhea.  Had a sore throat at first.  That is better.  Ears have felt a little bit plugged and she has been popping them.  She does not have pain in her teeth.  She did have some sweats in the middle of the night a few days ago.  Otherwise no fevers or chills.  She had a sinus infection in March.  She complains of sensitivity in her ears to changes and pressure.  Had a lot of discomfort when she traveled to [place].  There is family history of many years disease in her mother.  She is interested in seeing an ear nose and throat provider and would like a referral.  We reviewed medications and allergies.  Remainder of review of systems is negative.  No other concerns today.      Current Outpatient Prescriptions   Medication Sig Dispense Refill     fluticasone (FLONASE) 50 mcg/actuation nasal spray 1-2 sprays in each nostril at bedtime. 16 g 0     sertraline (ZOLOFT) 50 MG tablet TAKE ONE TABLET BY MOUTH ONE TIME DAILY 90 tablet 3     amoxicillin-clavulanate (AUGMENTIN) 875-125 mg per tablet Take 1 tablet by mouth 2 (two) times a day for 10 days. 20 tablet 0     No current facility-administered medications for this visit.        Past Medical History, Family History, and Social History reviewed.  Past Medical History:   Diagnosis Date     Major Depression, Single Episode     Created by Conversion      No past surgical history on file.  Review of patient's allergies indicates no known allergies.  Family History   Problem Relation Age of Onset     Hypertension Mother      Hypertension Father      Pulmonary embolism Father      Clotting disorder Father       Pulmonary embolism Paternal Grandfather      Social History     Social History     Marital status:      Spouse name: N/A     Number of children: N/A     Years of education: N/A     Occupational History     Not on file.     Social History Main Topics     Smoking status: Never Smoker     Smokeless tobacco: Never Used     Alcohol use 8.4 oz/week     14 Glasses of wine per week     Drug use: No     Sexual activity: No     Other Topics Concern     Not on file     Social History Narrative         Review of systems is as stated in HPI, and the remainder of the 10 system review is otherwise negative.    Objective:     Vitals:    07/17/17 1440   BP: 110/64   Patient Site: Right Arm   Patient Position: Sitting   Cuff Size: Adult Regular   Pulse: 87   Temp: 98.2  F (36.8  C)   TempSrc: Tympanic   SpO2: 98%   Weight: 145 lb (65.8 kg)    Body mass index is 24.5 kg/(m^2).    General appearance: alert, appears stated age and cooperative  Head: Normocephalic, without obvious abnormality, atraumatic  Eyes: conjunctivae/corneas clear. PERRL, EOM's intact. Fundi benign.  Ears: normal TM's and external ear canals both ears and effusion behind right TM  Nose: mild erythema of nasal mucosa, no sinus tenderness, no TMJ tenderness  Throat: lips, mucosa, and tongue normal; teeth and gums normal  Neck: no adenopathy, supple, symmetrical, trachea midline and thyroid not enlarged, symmetric, no tenderness/mass/nodules  Lungs: clear to auscultation bilaterally  Neurologic: Grossly normal  Psych: mood appropriate, affect normal    Results: PHQ-9: 1   REBECCA-7: 1      This note has been dictated using voice recognition software. Any grammatical or context distortions are unintentional and inherent to the the software.

## 2021-06-13 NOTE — PROGRESS NOTES
Assessment/Plan:      Problem List Items Addressed This Visit     Recurrent major depressive disorder, in partial remission (H)     Increase sertraline to 100 mg daily. Elimination of alcohol use also recommended given depressive effects and potential effects on palpitations. Once mood well controlled limited use may be acceptable.         Relevant Medications    sertraline (ZOLOFT) 100 MG tablet      Other Visit Diagnoses     Palpitations    -  Primary    Relevant Orders    Electrocardiogram Perform - Clinic (Completed)    Comprehensive Metabolic Panel (Completed)    Thyroid Stimulating Hormone (TSH) (Completed)    HM2(CBC w/o Differential) (Completed)    Vitamin D, Total (25-Hydroxy) (Completed)    Holter Monitor    Post-nasal drip        Relevant Medications    fluticasone propionate (FLONASE) 50 mcg/actuation nasal spray          Patient Instructions   1. We will notify you of lab results.  2. Increase sertraline to 100 mg daily.  3. Eliminate alcohol use for the time being.  4. Check blood pressure at home.  5. You should get a call to schedule for Holter or you can call them.        No follow-ups on file.    Subjective:   Amee Nettles is a 45 y.o. female who presents today with concerns about her blood pressure. A couple of days ago (Tuesday) she had an episode where her heart was racing and she felt anxious. She also had a headache with those symptoms. She has been checking her blood pressure at home. It has been 140-150/. She was anxious when she checked her pressure. She has chronic issues with TMJ and took some medication for that on Monday and Tuesday evening. She had a similar episode with racing heart a couple of weeks ago. She had a similar episode last December and was seen in the ER. Work up at that time was negative. She was referred to ENT for that issue. Her sister does have some palpitations but the patient isn't aware of any particular diagnosis. There is a family history of  "anxiety.    The patient did have an episode of rapid heart rate this morning and last night. She reports the symptoms were all day yesterday. This morning she reports the episode was about 1 1/2 hours. She usually drinks about 2 cups of coffee per day but minimal other caffeine use. No tobacco use. No street drug use. She does drink about 3 drinks per night of alcohol. The patient is on sertraline 50 mg daily and she has been on that for several years, it was originally started for postpartum depression.    Patient Active Problem List   Diagnosis     Generalized anxiety disorder     TMJ (temporomandibular joint syndrome)     Recurrent major depressive disorder, in partial remission (H)      Past Medical History:   Diagnosis Date     Major Depression, Single Episode     Created by Conversion      No past surgical history on file.    Review of System: Relevant items noted in HPI. ROS otherwise negative.     Objective:     Vitals:    12/11/20 1503   BP: 122/80   Pulse: 60   Resp: 20   Temp: 98.1  F (36.7  C)   TempSrc: Oral   Weight: 156 lb (70.8 kg)   Height: 5' 5\" (1.651 m)   LMP: 12/04/2020       Physical Exam  Constitutional:       General: She is not in acute distress.     Appearance: She is not ill-appearing.   HENT:      Right Ear: Tympanic membrane and ear canal normal.      Left Ear: Tympanic membrane and ear canal normal.   Neck:      Musculoskeletal: Normal range of motion.      Thyroid: No thyromegaly.   Cardiovascular:      Rate and Rhythm: Normal rate and regular rhythm.      Heart sounds: No murmur.   Pulmonary:      Effort: Pulmonary effort is normal.      Breath sounds: Normal breath sounds. No wheezing or rhonchi.   Abdominal:      General: Abdomen is flat. Bowel sounds are normal. There is no distension.      Palpations: There is no hepatomegaly or splenomegaly.      Tenderness: There is no abdominal tenderness.   Musculoskeletal:      Right lower leg: No edema.      Left lower leg: No edema. "   Neurological:      Mental Status: She is oriented to person, place, and time.      Cranial Nerves: No cranial nerve deficit.       Most Recent EKG     Units 12/11/20  1548   VENTRATE BPM 60   ATRIALRATE BPM 60   QRSDURATION ms 82   QTINTERVAL ms 456   QTCCALC ms 456   P [place] degrees 60   RAXIS degrees -4   TAXIS degrees 18   MUSEDX  Sinus rhythm  Normal ECG  No previous ECGs available  Confirmed by RAJIV RANGEL MD LOC:[facility] ([id number]) on 12/11/2020 5:21:11 PM

## 2021-06-13 NOTE — PROGRESS NOTES
Hearing evaluation in conjunction with ENT exam (Dr. Aranda)    History:  Severe otalgia and dizziness during the descent portion of air travel; this discomfort can last for days afterward. Her next trip is scheduled for the end of October. She has used Flonase, Afrin, Sudafed to help, with limited results. She denied hearing loss, true vertigo, tinnitus, aural fullness, or recent illness. 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 Her mother has a diagnosis of Meniere's disease.    Results:  Otoscopy was unremarkable in either ear. Hearing sensitivity was assessed with good reliability using insert phones.      Normal hearing sensitivity, bilaterally, for 250-8000 Hz.    Speech reception thresholds showed agreement with pure tone findings in each ear. Word recognition ability was excellent, bilaterally, with presentation levels below typical conversational volume in both ears.    Tympanometry was consistent with normal middle ear function, bilaterally (hypermobile tracing was obtained in the right ear only).    Recommendations:  Follow-up with ENT; retest hearing annually (to monitor) or per medical management.  Wear hearing protection consistently in noise to preserve residual hearing sensitivity.    Jericho Pacheco, [facility]  [place] Licensed Audiologist [id number]

## 2021-06-13 NOTE — TELEPHONE ENCOUNTER
Please check with patient on this refill request.  Request is for sertraline 50 mg daily.  It looks like her dose was recently increased to 100 mg daily.  Would she like a prescription for the 100 mg tablet instead?

## 2021-06-13 NOTE — PROGRESS NOTES
Amee Nettles is a 42 y.o. female seen in consultation at the request of Dr. Luna for ear pain and dizziness with flights. Last spring patient had acute ear pain and non vertigo dizziness on descent.  She has problems regularly on flights. Denies other infections or surgeries to the ears.  She has tried fluticasone, oxymetazoline, pseudoephedrine, and valsalva techniques without benefit.    ALLERGY:  No Known Allergies    MEDICATIONS:     Current Outpatient Prescriptions on File Prior to Visit   Medication Sig Dispense Refill     fluticasone (FLONASE) 50 mcg/actuation nasal spray 1-2 sprays in each nostril at bedtime. 16 g 0     sertraline (ZOLOFT) 50 MG tablet TAKE ONE TABLET BY MOUTH ONE TIME DAILY 90 tablet 3     No current facility-administered medications on file prior to visit.        Past Medical/Surgical History, Family History and Social History reviewed in detail and documented separately in the medical record.    Complete Review of Systems:  A 10-point review was performed.  Pertinent positives are noted in the HPI and on a separate scanned document in the chart.    EXAM:  There were no vitals filed for this visit.    Nurse documentation reviewed  and documented separately.    General Appearance: Pleasant, alert, appropriate appearance for age. No acute distress    Head Exam: Normal. Normocephalic, atraumatic.    Eye Exam: Normal external eye, conjunctiva, lids, cornea. Extra-ocular movements are intact.    Left external ear: normal  Left otoscopic exam: Normal EAC. Normal TM     Right external ear: normal  Right otoscopic exam: Normal EAC. Normal TM    Nose Exam: Normal external nose. Septum midline. Nasal mucosa normal.  Inferior turbinates normal.    OroPharynx Exam: Dental hygiene adequate. Normal tongue. Normal buccal mucosa. Normal palate.  Normal pharynx. Normal tonsils.    Neck Exam: Supple, no masses or nodes. Trachea and larynx midline.    Thyroid Exam: No tenderness, nodules or  enlargement.    Salivary Glands: nontender without masses    Neuro: Alert and oriented times 3, CN 2-12 grossly intact, no nystagmus, PERRL, EOMI, normal speech and gait    Chest/Respiratory Exam: Normal chest wall motion and respiratory effort. No audible stridor or wheezing.    Cardiovascular Exam: Regular rate and rhythm.  No cyanosis, clubbing or edema.    Pulses: carotid pulses normal    ASSESSMENT:  1. Barotrauma, otic, initial encounter        PLAN: Findings, assessment, and management options were discussed. She is doing really all she can to avoid pressure issues.  We discussed placing tubes but she does not fly on a regular basis.  She may try Earplanes as well.  Discussed using fluticasone for a full month prior to flight and Afrin an hour before descent.

## 2021-06-13 NOTE — TELEPHONE ENCOUNTER
Left message to call back for: verify RX dose   Information to relay to patient:  See message below , 50 mg or 100 mg ?

## 2021-06-13 NOTE — TELEPHONE ENCOUNTER
Left message to call back for: verify RX dose   Information to relay to patient:  See message below

## 2021-06-13 NOTE — PROGRESS NOTES
Assessment/Plan:      Problem List Items Addressed This Visit     Generalized anxiety disorder    TMJ (temporomandibular joint syndrome) - Primary     Continue physical therapy. Continued limited use of Norflex is reasonable. We will limit to 30 tabs every 3 months.         Relevant Medications    orphenadrine (NORFLEX) 100 mg tablet    Recurrent major depressive disorder, in partial remission (H)     Patient already seeing improving with recent sertraline dose increase. She has also reduced alcohol intake. Continue current treatment.               Patient Instructions   1. Proceed with Holter monitor.  2. Continue current medications.  3. Norflex as needed at bedtime. Limit to 10 tabs or less per month.          Subjective:   Amee Nettles is a 45 y.o. female who presents today for follow up on a couple of issues. She is tolerating the increase in sertraline well and already is finding that anxiety and palpitations are improving. She has not yet scheduled for her Holter monitor but will do that in the near future.    She is also wanting to discuss her TMJ. Her specialist has prescribed norflex for use as needed at bedtime which she reports is helpful. She is using it intermittently. Her specialist wanted this to be prescribed by her PCP so all her medications were prescribed by the same provider. The patient is also continuing physical therapy.    Patient Active Problem List   Diagnosis     Generalized anxiety disorder     TMJ (temporomandibular joint syndrome)     Recurrent major depressive disorder, in partial remission (H)      Past Medical History:   Diagnosis Date     Major Depression, Single Episode     Created by Conversion      History reviewed. No pertinent surgical history.    Review of System: Relevant items noted in HPI. ROS otherwise negative.     Objective:     Vitals:    12/18/20 1123   BP: 130/82   Pulse: 64   Resp: 16   Temp: 98.2  F (36.8  C)   TempSrc: Oral   Weight: 156 lb 7 oz (71 kg)  "  Height: 5' 5\" (1.651 m)   LMP: 12/04/2020       Physical Exam  Constitutional:       General: She is not in acute distress.     Appearance: She is not ill-appearing.   Neurological:      General: No focal deficit present.      Cranial Nerves: No cranial nerve deficit.   Psychiatric:         Mood and Affect: Mood normal.         Behavior: Behavior normal.       "

## 2021-06-13 NOTE — TELEPHONE ENCOUNTER
RN cannot approve Refill Request    RN can NOT refill this medication PCP messaged that patient is overdue for Office Visit. Last office visit: 6/22/2018 Ryan Gallo MD Last Physical: Visit date not found Last MTM visit: Visit date not found Last visit same specialty: 12/11/2020 Sarai Grye MD.  Next visit within 3 mo: Visit date not found  Next physical within 3 mo: Visit date not found      Margaux Mcgarry, [facility] Triage/Med Refill 12/20/2020    Requested Prescriptions   Pending Prescriptions Disp Refills     sertraline (ZOLOFT) 50 MG tablet [Pharmacy Med Name: SERTRALINE HCL 50 MG TABLET] 90 tablet 1     Sig: TAKE 1 TABLET BY MOUTH EVERY DAY       [facility] Refill Protocol  Failed - 12/18/2020 12:50 AM        Failed - PCP or prescribing provider visit in last year     Last office visit with prescriber/PCP: 6/22/2018 Ryan Gallo MD OR same dept: Visit date not found OR same specialty: 12/11/2020 Sarai Grey MD  Last physical: Visit date not found Last MTM visit: Visit date not found   Next visit within 3 mo: Visit date not found  Next physical within 3 mo: Visit date not found  Prescriber OR PCP: Ryan Gallo MD  Last diagnosis associated with med order: 1. Major depression in remission (H)  - sertraline (ZOLOFT) 50 MG tablet [Pharmacy Med Name: SERTRALINE HCL 50 MG TABLET]; TAKE 1 TABLET BY MOUTH EVERY DAY  Dispense: 90 tablet; Refill: 1    If protocol passes may refill for 12 months if within 3 months of last provider visit (or a total of 15 months).

## 2021-06-13 NOTE — PATIENT INSTRUCTIONS - HE
1. Proceed with Holter monitor.  2. Continue current medications.  3. Norflex as needed at bedtime. Limit to 10 tabs or less per month.

## 2021-06-14 NOTE — TELEPHONE ENCOUNTER
Spoke with patient and she is taking sertraline 100 mg daily.  Please send rx to the pharmacy if appropriate.

## 2021-06-15 NOTE — TELEPHONE ENCOUNTER
Refill Approved    Rx renewed per Medication Renewal Policy. Medication was last renewed on 12/11/20.    Ricky Matias, [facility] Triage/Med Refill 2/10/2021     Requested Prescriptions   Pending Prescriptions Disp Refills     fluticasone propionate (FLONASE) 50 mcg/actuation nasal spray [Pharmacy Med Name: FLUTICASONE PROP 50 MCG SPRAY] 16 g 5     Sig: SPRAY 1 SPRAY INTO EACH NOSTRIL EVERY DAY       Nasal Steroid Refill Protocol Passed - 2/10/2021  8:34 AM        Passed - Patient has had office visit/physical in last 2 years     Last office visit with prescriber/PCP: 12/18/2020 OR same dept: 12/18/2020 Sarai Grey MD OR same specialty: 12/18/2020 Sarai Grey MD Last physical: Visit date not found Last MTM visit: Visit date not found    Next appt within 3 mo: Visit date not found  Next physical within 3 mo: Visit date not found  Prescriber OR PCP: Sarai Grey MD  Last diagnosis associated with med order: 1. Post-nasal drip  - fluticasone propionate (FLONASE) 50 mcg/actuation nasal spray [Pharmacy Med Name: FLUTICASONE PROP 50 MCG SPRAY]; SPRAY 1 SPRAY INTO EACH NOSTRIL EVERY DAY  Dispense: 16 g; Refill: 5     If protocol passes may refill for 12 months if within 3 months of last provider visit (or a total of 15 months).

## 2021-06-16 PROBLEM — M26.609 TMJ (TEMPOROMANDIBULAR JOINT SYNDROME): Status: ACTIVE | Noted: 2020-12-11

## 2021-06-16 PROBLEM — F33.41 RECURRENT MAJOR DEPRESSIVE DISORDER, IN PARTIAL REMISSION (H): Status: ACTIVE | Noted: 2020-12-16

## 2021-06-18 NOTE — PROGRESS NOTES
"Assessment:     1. Major depression in remission (H)  sertraline (ZOLOFT) 50 MG tablet       Plan:     1. Major depression in remission (H)  PHQ 9 score 0 REBECCA 7 score 0; patient doing very well medication renewed for 1 year  - sertraline (ZOLOFT) 50 MG tablet; TAKE ONE TABLET BY MOUTH ONE TIME DAILY  Dispense: 90 tablet; Refill: 3      Subjective:   I am seeing Amee for the first time.  We have reviewed her past medical history including her postpartum depression.  The patient has been on Zoloft for 8 years and doing quite well.  She is a teacher and a violin player.  She is a mother of 2 boys.  All as well I have reviewed her PHQ 9 and REBECCA scores.  Pros and cons of therapy reviewed.  System review entirely negative no visual problems hearing problems dysphagia shortness of breath dyspnea chest pain angina abdominal pain diarrhea constipation urgency frequency dysuria no dry mouth.  Medication review mood for over a year.  Follow-up with her primary care physician Dr. Luna.  All medical questions or were asked were answered I personally reviewed family and social history surgeries allergies problems list.    Review of Systems: A complete 14 point review of systems was obtained and is negative or as stated in the history of present illness.    Past Medical History:   Diagnosis Date     Major Depression, Single Episode     Created by Conversion      Family History   Problem Relation Age of Onset     Hypertension Mother      Hypertension Father      Pulmonary embolism Father      Clotting disorder Father      Pulmonary embolism Paternal Grandfather      No past surgical history on file.  Social History   Substance Use Topics     Smoking status: Never Smoker     Smokeless tobacco: Never Used     Alcohol use 8.4 oz/week     14 Glasses of wine per week         Objective:   /66  Pulse 60  Ht 5' 4.5\" (1.638 m)  Wt 135 lb 1.6 oz (61.3 kg)  BMI 22.83 kg/m2    General Appearance:  Alert, cooperative, no " distress  Head:  Normocephalic, no obvious abnormality  Ears: TM anatomy normal  Eyes:  PERRL, EOM's intact, conjunctiva and corneas clear  Nose:  Nares symmetrical, septum midline, mucosa pink, no sinus tenderness  Throat:  Lips, tongue, and mucosa are moist, pink, and intact  Neck:  Supple, symmetrical, trachea midline, no adenopathy; thyroid: no enlargement, symmetric,no tenderness/mass/nodules; no carotid bruit, no JVD  Back:  Symmetrical, no curvature, ROM normal, no CVA tenderness  Chest/Breast:  No mass or tenderness  Lungs:  Clear to auscultation bilaterally, respirations unlabored   Heart:  Normal PMI, regular rate & rhythm, S1 and S2 normal, no murmurs, rubs, or gallops  Abdomen:  Soft, non-tender, bowel sounds active all four quadrants, no mass, or organomegaly  Musculoskeletal:  Tone and strength strong and symmetrical, all extremities  Lymphatic:  No adenopathy  Skin/Hair/Nails:  Skin warm, dry, and intact, no rashes  Neurologic:  Alert and oriented x3, no cranial nerve deficits, normal strength and tone, gait steady  Extremities:  No edema.  Keena's sign negative.    Genitourinary: deferred  Pulses:  Equal bilaterally           This note has been dictated using voice recognition software. Any grammatical or context distortions are unintentional and inherent to the the software.

## 2021-06-21 NOTE — LETTER
Letter by Sarai Grey MD at      Author: Sarai Grey MD Service: -- Author Type: --    Filed:  Encounter Date: 12/11/2020 Status: (Other)                    My Depression Action Plan  Name: Amee Nettles   Date of Birth 1975  Date: 12/11/2020    My Doctor: Ines Luna MD   My Clinic: [address] [address]  [phone]          GREEN    ZONE   Good Control    What it looks like:     Things are going generally well. You have normal ups and downs. You may even feel depressed from time to time, but bad moods usually last less than a day.   What you need to do:  1. Continue to care for yourself (see self care plan)  2. Check your depression survival kit and update it as needed  3. Follow your physicians recommendations including any medication.  4. Do not stop taking medication unless you consult with your physician first.           YELLOW         ZONE Getting Worse    What it looks like:     Depression is starting to interfere with your life.     It may be hard to get out of bed; you may be starting to isolate yourself from others.    Symptoms of depression are starting to last most all day and this has happened for several days.     You may have suicidal thoughts but they are not constant.   What you need to do:     1. Call your care team. Your response to treatment will improve if you keep your care team informed of your progress. Yellow periods are signs an adjustment may need to be made.     2. Continue your self-care.  Just get dressed and ready for the day.  Don't give yourself time to talk yourself out of it.    3. Talk to someone in your support network.    4. Open up your depression Depression Self-Care Plan / Wellness kit.           RED    ZONE Medical Alert - Get Help    What it looks like:     Depression is seriously interfering with your life.     You may experience these or other symptoms: You cant get out of bed most  days, cant work or engage in other necessary activities, you have trouble taking care of basic hygiene, or basic responsibilities, thoughts of suicide or death that will not go away, self-injurious behavior.     What you need to do:  1. Call your care team and request a same-day appointment. If they are not available (weekends or after hours) call your local crisis line, emergency room or 911.            Self-Care Plan / Wellness Kit    Self-Care for Depression  Heres the deal. Your body and mind are really not as separate as most people think.  What you do and think affects how you feel and how you feel influences what you do and think. This means if you do things that people who feel good do, it will help you feel better.  Sometimes this is all it takes.  There is also a place for medication and therapy depending on how severe your depression is, so be sure to consult with your medical provider and/ or Behavioral Health Consultant if your symptoms are worsening or not improving.     In order to better manage my stress, I will:    Exercise  Get some form of exercise, every day. This will help reduce pain and release endorphins, the feel good chemicals in your brain. This is almost as good as taking antidepressants!  This is not the same as joining a gym and then never going! (they count on that by the way?) It can be as simple as just going for a walk or doing some gardening, anything that will get you moving.      Hygiene   Maintain good hygiene (get out of bed in the morning, make your bed, brush your teeth, take a shower, and get dressed like you were going to work, even if you are unemployed).  If your clothes don't fit try to get ones that do.    Diet  Strive to eat foods that are good for me, drink plenty of water, and avoid excessive sugar, caffeine, alcohol, and other mood-altering substances.  Some foods that are helpful in depression are: complex carbohydrates, B vitamins, flaxseed, fish or fish oil,  fresh fruits and vegetables.    Psychotherapy  Agree to participate in Individual Therapy (if recommended).    Medication  If prescribed medications, I agree to take them.  Missing doses can result in serious side effects.  I understand that drinking alcohol, or other illicit drug use, may cause potential side effects.  I will not stop my medication abruptly without first discussing it with my provider.    Staying Connected With Others  Stay in touch with my friends, family members, and my primary care provider/team.    Use your imagination  Be creative.  We all have a creative side; it doesnt matter if its oil painting, sand castles, or mud pies! This will also kick up the endorphins.    Witness Beauty  (AKA stop and smell the roses) Take a look outside, even in mid-winter. Notice colors, textures. Watch the squirrels and birds.     Service to others  Be of service to others.  There is always someone else in need.  By helping others we can get out of ourselves and remember the really important things.  This also provides opportunities for practicing all the other parts of the program.    Humor  Laugh and be silly!  Adjust your TV habits for less news and crime-drama and more comedy.    Control your stress  Try breathing deep, massage therapy, biofeedback, and meditation. Find time to relax each day.     Crisis Text Line  http://www.crisistextline.org    The Crisis Text Line serves anyone, in any type of crisis, providing access to free, 24/7 support and information via the medium people already use and trust:    Here's how it works:  1.  Text 489-647 from anywhere in the [place], anytime, about any type of crisis.  2.  A live, trained Crisis Counselor receives the text and responds quickly.  3.  The volunteer Crisis Counselor will help you move from a 'hot moment to a cool moment'.  My support system    Clinic Contact:  Phone number:    Contact 1:  Phone number:    Contact 2:  Phone number:    Scientology/spiritual  advisor:  Phone number:    Therapist:  Phone number:    [facility] center:    Phone number:    Other community support:  Phone number:

## 2021-06-23 NOTE — PROGRESS NOTES
"Assessment/plan   Amee Nettles is a 43 y.o. female who is a patient of Ines Luna MD.    Amee was seen today for neck pain and headache.    Diagnoses and all orders for this visit:      Whiplash injury, initial encounter  Neck sprain, initial encounter  Concussion without loss of consciousness, initial encounter  Discussed her head injury and mechanism is c/w whiplash. No LOC and neuro exam today is normal. No indication for head or next imaging today. Suspect soft tissue injury of the neck. Mild nausea and headache c/w mild concussion. Discussed importance of brain rest, use of heat to the neck, with naproxen and tylenol for pain relief. Will send flexeril as well if she develops more spasm she can take this.   Reviewed in detail concerning sx and signs which would warrant earlier evaluation- such as severe headache, vomiting, vision change, and other neuro deficits.   Counseled on avoidance of further snowboarding activities until fully recovered. Encouraged continued use of safety gear including helmet.   -     naproxen (NAPROSYN) 500 MG tablet; Take 1 tablet (500 mg total) by mouth 2 (two) times a day with meals.  -     acetaminophen (TYLENOL EXTRA STRENGTH) 500 MG tablet; Take 2 tablets (1,000 mg total) by mouth every 6 (six) hours as needed for pain.  -     cyclobenzaprine (FLEXERIL) 5 MG tablet; Take 1-2 tablets (5-10 mg total) by mouth 3 (three) times a day as needed for muscle spasms.    Follow up: 1 week or less    Romy Strickland MD    Subjective:      HPI: Amee Nettles is a 43 y.o. female who is here for:    Chief Complaint   Patient presents with     Neck Pain     advil not doing anything for pain     Headache     nausea, fell backwards and hit head snowboarding yesterday 1/20       Snowboarding accident yesterday 1/20: Just starting to learn snowboarding; fell down and head \"snapped back\" and hit the ground. Felt it was somewhat uncomfortable but kept going for about 3 hours.  Again fell " trying to get on the ski lift with her son. Fell down, hitting back of her head. Stopped at that time. No loss of consciousness with either of these head injuries yesterday.    Neck is sore. Back of her eyes have pressure like a headache. Bright lights make it worse. Mild nausea here and there. No vomiting. No blorry vision.     No memory or speech issues. No imblanace.   Tried ibuprofen 600mg at 7am and 11:30am and no improvement.   No loss of consciousness.  Was wearing a helmet.    She is an - plays the [employer].   Her son was sick yesterday with gastroenteritis sx. She is wondering if she has nausea from his illness.      Review of Systems:  + Nausea, mild headache as above  12 point comprehensive review of systems was negative except as noted and HPI     Social History:  Social History     Social History Narrative     Not on file       Medical History:  Patient Active Problem List   Diagnosis   (none) - all problems resolved or deleted     Past Medical History:   Diagnosis Date     Major Depression, Single Episode     Created by Conversion      No past surgical history on file.  Patient has no known allergies.  Family History   Problem Relation Age of Onset     Hypertension Mother      Hypertension Father      Pulmonary embolism Father      Clotting disorder Father      Pulmonary embolism Paternal Grandfather        Medications:  Current Outpatient Medications   Medication Sig Dispense Refill     sertraline (ZOLOFT) 50 MG tablet TAKE ONE TABLET BY MOUTH ONE TIME DAILY 90 tablet 3     acetaminophen (TYLENOL EXTRA STRENGTH) 500 MG tablet Take 2 tablets (1,000 mg total) by mouth every 6 (six) hours as needed for pain. 100 tablet 2     cyclobenzaprine (FLEXERIL) 5 MG tablet Take 1-2 tablets (5-10 mg total) by mouth 3 (three) times a day as needed for muscle spasms. 20 tablet 1     naproxen (NAPROSYN) 500 MG tablet Take 1 tablet (500 mg total) by mouth 2 (two) times a day with meals. 60 tablet 2      No current facility-administered medications for this visit.        Imaging & Labs reviewed this visit: none      Objective:      Vitals:    01/21/19 1609   BP: 118/70   Pulse: 60   Weight: 143 lb (64.9 kg)       Physical Exam:     General: Alert, no acute distress. Well dressed, talkative.   HEENT: normocephalic, atraumatic.  PAUL, no nystagmus, conjunctivae are clear, Normal pearly TMs bilaterally without erythema, pus or fluid. Position and landmarks are normal.  Nose is clear.  Oropharynx is moist and clear, without tonsillar hypertrophy, asymmetry, exudate or lesions.  Neck: supple without adenopathy. No cervical spine tenderness. There is notable tenderness to palpation over the SCM, trapezius and levators bilaterally. Normal range of motion of the neck with F/E, lateral rotation and rotation.   Lungs: Good aeration bilaterally. Clear to auscultation without wheezes, rales or rhonci.   Heart: regular rate and rhythm, normal S1 and S2, no murmurs  Abdomen: soft and nontender  Skin: clear without rash or lesions  Neuro: alert, interactive moving all extremities equally, CN II -XII are normal, normal muscle tone in all 4 extremities, deep tendon reflexes 2+ symmetrically at the patella.      Romy Strickland MD

## 2021-06-27 ENCOUNTER — HEALTH MAINTENANCE LETTER (OUTPATIENT)
Age: 46
End: 2021-06-27

## 2021-06-27 NOTE — PROGRESS NOTES
"Progress Notes by Ines Payne AuD at 5/21/2019  3:30 PM     Author: Ines Payne AuD Service: -- Author Type: Audiologist    Filed: 5/21/2019  4:34 PM Encounter Date: 5/21/2019 Status: Signed    : Ines Payne AuD (Audiologist)       Hearing evaluation in conjunction with ENT exam (Dr. Aranda)    Summary:  Audiology visit completed. Please see audiogram below or under \"media\" tab for history and results.    Transducer:  Both insert phones and circumaural headphones were used.    Reliability:    Good    Recommendations:  Follow-up with ENT; retest hearing per medical management or patient concern.  Wear hearing protection consistently in noise to preserve residual hearing sensitivity.     Jericho Pacheco, [facility]  [place] Licensed Audiologist [id number]             "

## 2021-06-28 NOTE — PROGRESS NOTES
"Progress Notes by Gina Miranda AuD at 12/30/2019  7:30 AM     Author: Gina Miranda AuD Service: -- Author Type: Audiologist    Filed: 12/30/2019  7:52 AM Encounter Date: 12/30/2019 Status: Signed    : Gina Miranda AuD (Audiologist)       Audiology Report    Summary: Audiology visit completed. Please see audiogram below or in \"media\" tab for case history and results.     Transducer: Circumaural headphones    Reliability was good  and there was good  SRT to PTA agreement.       Plan:  The patient is returned to ENT for follow up. Return for retesting with ENT recommendation.      Ofe Bowen.  Clinical Audiologist  [place] #[id number]           "

## 2021-10-08 ENCOUNTER — OFFICE VISIT (OUTPATIENT)
Dept: FAMILY MEDICINE | Facility: CLINIC | Age: 46
End: 2021-10-08
Payer: COMMERCIAL

## 2021-10-08 VITALS
DIASTOLIC BLOOD PRESSURE: 94 MMHG | WEIGHT: 155 LBS | BODY MASS INDEX: 25.79 KG/M2 | RESPIRATION RATE: 18 BRPM | SYSTOLIC BLOOD PRESSURE: 159 MMHG | HEART RATE: 58 BPM | TEMPERATURE: 98.3 F | OXYGEN SATURATION: 96 %

## 2021-10-08 DIAGNOSIS — I10 HYPERTENSION, UNSPECIFIED TYPE: Primary | ICD-10-CM

## 2021-10-08 LAB
ALBUMIN SERPL-MCNC: 3.9 G/DL (ref 3.5–5)
ALP SERPL-CCNC: 48 U/L (ref 45–120)
ALT SERPL W P-5'-P-CCNC: 13 U/L (ref 0–45)
ANION GAP SERPL CALCULATED.3IONS-SCNC: 9 MMOL/L (ref 5–18)
AST SERPL W P-5'-P-CCNC: 22 U/L (ref 0–40)
BASOPHILS # BLD AUTO: 0 10E3/UL (ref 0–0.2)
BASOPHILS NFR BLD AUTO: 0 %
BILIRUB SERPL-MCNC: 0.5 MG/DL (ref 0–1)
BUN SERPL-MCNC: 9 MG/DL (ref 8–22)
CALCIUM SERPL-MCNC: 9.8 MG/DL (ref 8.5–10.5)
CHLORIDE BLD-SCNC: 103 MMOL/L (ref 98–107)
CO2 SERPL-SCNC: 25 MMOL/L (ref 22–31)
CREAT SERPL-MCNC: 0.78 MG/DL (ref 0.6–1.1)
EOSINOPHIL # BLD AUTO: 0.1 10E3/UL (ref 0–0.7)
EOSINOPHIL NFR BLD AUTO: 1 %
ERYTHROCYTE [DISTWIDTH] IN BLOOD BY AUTOMATED COUNT: 12.3 % (ref 10–15)
GFR SERPL CREATININE-BSD FRML MDRD: >90 ML/MIN/1.73M2
GLUCOSE BLD-MCNC: 80 MG/DL (ref 70–125)
HCT VFR BLD AUTO: 40.4 % (ref 35–47)
HGB BLD-MCNC: 13.2 G/DL (ref 11.7–15.7)
IMM GRANULOCYTES # BLD: 0 10E3/UL
IMM GRANULOCYTES NFR BLD: 0 %
LYMPHOCYTES # BLD AUTO: 1.6 10E3/UL (ref 0.8–5.3)
LYMPHOCYTES NFR BLD AUTO: 25 %
MCH RBC QN AUTO: 30.8 PG (ref 26.5–33)
MCHC RBC AUTO-ENTMCNC: 32.7 G/DL (ref 31.5–36.5)
MCV RBC AUTO: 94 FL (ref 78–100)
MONOCYTES # BLD AUTO: 0.7 10E3/UL (ref 0–1.3)
MONOCYTES NFR BLD AUTO: 11 %
NEUTROPHILS # BLD AUTO: 4.1 10E3/UL (ref 1.6–8.3)
NEUTROPHILS NFR BLD AUTO: 63 %
PLATELET # BLD AUTO: 225 10E3/UL (ref 150–450)
POTASSIUM BLD-SCNC: 4.5 MMOL/L (ref 3.5–5)
PROT SERPL-MCNC: 7.7 G/DL (ref 6–8)
RBC # BLD AUTO: 4.29 10E6/UL (ref 3.8–5.2)
SODIUM SERPL-SCNC: 137 MMOL/L (ref 136–145)
TSH SERPL DL<=0.005 MIU/L-ACNC: 2.1 UIU/ML (ref 0.3–5)
WBC # BLD AUTO: 6.5 10E3/UL (ref 4–11)

## 2021-10-08 PROCEDURE — 36415 COLL VENOUS BLD VENIPUNCTURE: CPT | Performed by: PHYSICIAN ASSISTANT

## 2021-10-08 PROCEDURE — 99214 OFFICE O/P EST MOD 30 MIN: CPT | Performed by: PHYSICIAN ASSISTANT

## 2021-10-08 PROCEDURE — 80050 GENERAL HEALTH PANEL: CPT | Performed by: PHYSICIAN ASSISTANT

## 2021-10-08 NOTE — PROGRESS NOTES
Chief Complaint   Patient presents with     Hypertension  x a month     Nasal Congestion     Sinus drip , headaches , ear pressure       ASSESSMENT/PLAN:  Amee was seen today for hypertension  x a month, nasal congestion and sinus drip , headaches , ear pressure.    Diagnoses and all orders for this visit:    Hypertension, unspecified type  -     CBC with platelets and differential; Future  -     Comprehensive metabolic panel (BMP + Alb, Alk Phos, ALT, AST, Total. Bili, TP); Future  -     TSH with free T4 reflex; Future  -     CBC with platelets and differential  -     Comprehensive metabolic panel (BMP + Alb, Alk Phos, ALT, AST, Total. Bili, TP)  -     TSH with free T4 reflex    Patient with elevated blood pressure today and at chiropractor's office.  Home readings are consistently above 130.  Has been having ongoing headache and sinus issues along with her TMJ.  It is possible the elevated blood pressure is contributing to this but I think it is less likely.  She likely has strong genetic component to this.  Discussed there is minimal risk associated with elevated blood pressures like this and she is safe to wait until her primary care visit next week to discuss treatment.  We will get baseline labs today.  Exam was unremarkable    Efe Tang PA-C  30 minutes spent on the date of the encounter doing chart review, history and exam, documentation and further activities per the note    SUBJECTIVE:  Amee is a 46 year old female who presents to urgent care with concerns for elevated blood pressure. This was found at her chiropractor visit about a week or 2 ago. She also deals with chronic headaches and TMJ and some nasal congestion. 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 She has been seeing a chiropractor for that. The blood pressure has been stressing her out and giving her some anxiety. She denies any shortness of breath, chest pain, presyncope. No lightheadedness or balance  issues. No heart palpitations. No leg swelling. She  has a strong family history of hypertension. Her sister who is younger is on blood pressure medication and so was her father. She has an appointment with her primary care provider next week    ROS: Pertinent ROS neg other than the symptoms noted above in the HPI.     OBJECTIVE:  BP (!) 159/94   Pulse 58   Temp 98.3  F (36.8  C)   Resp 18   Wt 70.3 kg (155 lb)   LMP 09/22/2021   SpO2 96%   Breastfeeding No   BMI 25.79 kg/m     GENERAL: healthy, alert and no distress  EYES: Eyes grossly normal to inspection  RESP: lungs clear to auscultation - no rales, rhonchi or wheezes  CV: regular rate and rhythm, normal S1 S2, no S3 or S4, no murmur, click or rub, no carotid bruits  ABDOMEN: soft, nontender, no bruits    DIAGNOSTICS    Results for orders placed or performed in visit on 10/08/21   CBC with platelets and differential     Status: None   Result Value Ref Range    WBC Count 6.5 4.0 - 11.0 10e3/uL    RBC Count 4.29 3.80 - 5.20 10e6/uL    Hemoglobin 13.2 11.7 - 15.7 g/dL    Hematocrit 40.4 35.0 - 47.0 %    MCV 94 78 - 100 fL    MCH 30.8 26.5 - 33.0 pg    MCHC 32.7 31.5 - 36.5 g/dL    RDW 12.3 10.0 - 15.0 %    Platelet Count 225 150 - 450 10e3/uL    % Neutrophils 63 %    % Lymphocytes 25 %    % Monocytes 11 %    % Eosinophils 1 %    % Basophils 0 %    % Immature Granulocytes 0 %    Absolute Neutrophils 4.1 1.6 - 8.3 10e3/uL    Absolute Lymphocytes 1.6 0.8 - 5.3 10e3/uL    Absolute Monocytes 0.7 0.0 - 1.3 10e3/uL    Absolute Eosinophils 0.1 0.0 - 0.7 10e3/uL    Absolute Basophils 0.0 0.0 - 0.2 10e3/uL    Absolute Immature Granulocytes 0.0 <=0.0 10e3/uL   CBC with platelets and differential     Status: None    Narrative    The following orders were created for panel order CBC with platelets and differential.  Procedure                               Abnormality         Status                     ---------                               -----------         ------                      CBC with platelets and d...[[id number]]                      Final result                 Please view results for these tests on the individual orders.        Current Outpatient Medications   Medication     ergocalciferol, vitamin D2, (VITAMIN D2 ORAL)     sertraline (ZOLOFT) 100 MG tablet     acetaminophen (TYLENOL EXTRA STRENGTH) 500 MG tablet     fluticasone propionate (FLONASE) 50 mcg/actuation nasal spray     orphenadrine (NORFLEX) 100 mg tablet     No current facility-administered medications for this visit.      Patient Active Problem List   Diagnosis     Generalized anxiety disorder     TMJ (temporomandibular joint syndrome)     Recurrent major depressive disorder, in partial remission (H)      Past Medical History:   Diagnosis Date     Major depressive disorder, single episode, unspecified     Created by Conversion      No past surgical history on file.  Family History   Problem Relation Age of Onset     Hypertension Mother      Hypertension Father      Pulmonary Embolism Father      Clotting Disorder Father      Pulmonary Embolism Paternal Grandfather      Other - See Comments Sister         palpitations     Anxiety Disorder Sister      Social History     Tobacco Use     Smoking status: Never Smoker     Smokeless tobacco: Never Used   Substance Use Topics     Alcohol use: Yes     Alcohol/week: 21.0 standard drinks              The plan of care was discussed with the patient. They understand and agree with the course of treatment prescribed. A printed summary was given including instructions and medications.  The use of Dragon/Expa dictation services may have been used to construct the content in this note; any grammatical or spelling errors are non-intentional. Please contact the author of this note directly if you are in need of any clarification.

## 2021-10-08 NOTE — PATIENT INSTRUCTIONS
Follow-up with your primary care provider next Thursday at the appointment they will go over the lab results and talk about options to treat your elevated blood pressure.  This is not an emergent or urgent issue.  If you do develop any shortness of breath or chest pain please come back to urgent care.    Measure your blood pressure once every other day and record that and present that to your primary care provider.  You do not need to do it more frequently than that    Patient Education     Eating Heart-Healthy Foods  Eating has a big impact on your heart health. In fact, eating healthier can improve several of your heart risks at once. For instance, it helps you manage weight, cholesterol, and blood pressure. Here are ideas to help you make heart-healthy changes without giving up all the foods and flavors you love.   Getting started    Talk with your healthcare provider about eating plans, such as the DASH or Mediterranean diet. You may also be referred to a dietitian.    Change a few things at a time. Give yourself time to get used to a few eating changes before adding more.    Work to create a tasty, healthy eating plan that you can stick to for the rest of your life.    Goals for healthy eating  Below are some tips to improve your eating habits:     Limit saturated fats and trans fats. Saturated fats raise your levels of cholesterol, so keep these fats to a minimum. They are found in foods such as fatty meats, whole milk, cheese, and palm and coconut oils. Avoid trans fats because they lower good cholesterol as well as raise bad cholesterol. Trans fats are most often found in processed foods, such as pastries, cookies, pies, muffins, fried foods, stick margarines, and shortening.    Reduce how much sodium (salt) you have. Eating too much salt may increase your blood pressure. Limit your sodium intake to 2,300 milligrams (mg) per day (the amount in 1 teaspoon of salt), or less if your healthcare provider  recommends it. Dining out less often and eating fewer processed foods are two great ways to decrease the amount of salt you consume. At home, flavor your foods with other spices and herbs instead of salt.    Managing calories. A calorie is a unit of energy. Your body burns calories for fuel, but if you eat more calories than your body burns, the extras are stored as fat. Your healthcare provider can help you create a diet plan to manage your calories. This will likely include eating healthier foods and getting regular exercise. To help you track your progress, keep a diary to record what you eat and how often you exercise.  Choose the right foods  Aim to make these foods staples of your diet. If you have diabetes, you may have different recommendations than what is listed here:     Fruits and vegetables provide plenty of nutrients without a lot of calories. At meals, fill half your plate with these foods. Choose between fresh, frozen, canned, or dried without added sauces, salt, or sugars. Split the other half of your plate between whole grains and lean protein.    Whole grains are high in fiber and rich in vitamins and nutrients. Good choices include whole wheat bread, pasta, oats, and brown rice. Make at least half of your grains whole grains.    Lean proteins give you nutrition with less fat. Good choices include fish, skinless chicken and turkey, and beans. Draining the fat from cooked ground meat is another way to reduce the amount of fat you eat.    Low-fat and nonfat dairy provide nutrients without a lot of fat. Try low-fat or nonfat milk, cheese, or yogurt.    Healthy fats can be good for you in small amounts. These are unsaturated fats, such as olive oil, nuts, and fish. Try to have at least 2 servings per week of fatty fish, such as salmon, sardines, mackerel, rainbow trout, and albacore tuna. These contain omega-3 fatty acids, which are good for your heart. Flaxseed and walnuts are other sources of  heart-healthy fats.  More on heart-healthy eating  Read food labels  Healthy eating starts at the grocery store. Be sure to pay attention to food labels on packaged foods. Look for products that are high in fiber and protein, and low in saturated fat, added sugars, and sodium. Avoid products that contain trans fat. And pay close attention to serving size. For instance, if you plan to eat two servings, double all the numbers on the label.   Prepare food right  A key part of healthy cooking is cutting down on added fat, sugar and salt. Look on the internet for lower-fat, lower-sodium recipes without a lot of added sugars. Also try these tips:     Remove fat from meat and skin from poultry before cooking.    Skim fat from the surface of soups and sauces.    Broil, roast, boil, bake, steam, grill, or microwave food without added fats.    Choose ingredients that spice up your food without adding calories, fat, sugar, or sodium. Try these items: horseradish, hot sauce, lemon, mustard, nonfat salad dressings, and vinegar. Small amounts of olive oil-based vinaigrettes are OK, too. For salt-free herbs and spices, try basil, cilantro, cinnamon, cumin, paprika, pepper, and rosemary.  uberall last reviewed this educational content on 7/1/2020 2000-2021 [employer]. All rights reserved. This information is not intended as a substitute for professional medical care. Always follow your healthcare professional's instructions.           Patient Education     Exercise for a Healthier Heart  You may wonder how you can improve the health of your heart. If you re thinking about exercise, you re on the right track. You don t need to become an athlete. But you do need a certain amount of brisk exercise to help strengthen your heart. If you have been diagnosed with a heart condition, your healthcare provider may advise exercise to help stabilize your condition. To help make exercise a habit, choose safe, fun activities.       Exercise with a friend. When activity is fun, you're more likely to stick with it.   Before you start  Check with your healthcare provider before starting an exercise program. This is especially important if you have not been active for a while. It's also important if you have a long-term (chronic) health problem such as heart disease, diabetes, or obesity. Or if you are at high risk for having these problems.   Why exercise?  Exercising regularly offers many healthy rewards. It can help you do all of the following:     Improve your blood cholesterol level to help prevent further heart trouble    Lower your blood pressure to help prevent a stroke or heart attack    Control diabetes, or reduce your risk of getting this disease    Improve your heart and lung function    Reach and stay at a healthy weight    Make your muscles stronger so you can stay active    Prevent falls and fractures by slowing the loss of bone mass (osteoporosis)    Manage stress better    Reduce your blood pressure    Improve your sense of self and your body image  Exercise tips      Ease into your routine. Set small goals. Then build on them. If you are not sure what your activity level should be, talk with your healthcare provider first before starting an exercise routine.    Exercise on most days. Aim for a total of 150 minutes (2 hours and 30 minutes) or more of moderate-intensity aerobic activity each week. Or 75 minutes (1 hour and 15 minutes) or more of vigorous-intensity aerobic activity each week. Or try for a combination of both. Moderate activity means that you breathe heavier and your heart rate increases but you can still talk. Think about doing 40 minutes of moderate exercise, 3 to 4 times a week. For best results, activity should last for about 40 minutes to lower blood pressure and cholesterol. It's OK to work up to the 40-minute period over time. Examples of moderate-intensity activity are walking 1 mile in 15 minutes. Or  doing 30 to 45 minutes of yard work.    Step up your daily activity level.  Along with your exercise program, try being more active the whole day. Walk instead of drive. Or park further away so that you take more steps each day. Do more household tasks or yard work. You may not be able to meet the advised mount of physical activity. But doing some moderate- or vigorous-intensity aerobic activity can help reduce your risk for heart disease. Your healthcare provider can help you figure out what is best for you.    Choose 1 or more activities you enjoy.  Walking is one of the easiest things you can do. You can also try swimming, riding a bike, dancing, or taking an exercise class.    When to call your healthcare provider  Call your healthcare provider if you have any of these:     Chest pain or feel dizzy or lightheaded    Burning, tightness, pressure, or heaviness in your chest, neck, shoulders, back, or arms    Abnormal shortness of breath    More joint or muscle pain    A very fast or irregular heartbeat (palpitations)  Lori last reviewed this educational content on 7/1/2019 2000-2021 [employer]. All rights reserved. This information is not intended as a substitute for professional medical care. Always follow your healthcare professional's instructions.

## 2021-10-13 ENCOUNTER — TRANSFERRED RECORDS (OUTPATIENT)
Dept: HEALTH INFORMATION MANAGEMENT | Facility: CLINIC | Age: 46
End: 2021-10-13

## 2021-10-14 ENCOUNTER — OFFICE VISIT (OUTPATIENT)
Dept: FAMILY MEDICINE | Facility: CLINIC | Age: 46
End: 2021-10-14
Payer: COMMERCIAL

## 2021-10-14 VITALS
WEIGHT: 154 LBS | BODY MASS INDEX: 25.66 KG/M2 | HEART RATE: 64 BPM | OXYGEN SATURATION: 99 % | HEIGHT: 65 IN | DIASTOLIC BLOOD PRESSURE: 88 MMHG | SYSTOLIC BLOOD PRESSURE: 138 MMHG

## 2021-10-14 DIAGNOSIS — I10 ESSENTIAL HYPERTENSION: Primary | ICD-10-CM

## 2021-10-14 DIAGNOSIS — F41.1 GENERALIZED ANXIETY DISORDER: ICD-10-CM

## 2021-10-14 DIAGNOSIS — Z12.31 SCREENING MAMMOGRAM, ENCOUNTER FOR: ICD-10-CM

## 2021-10-14 PROCEDURE — 99214 OFFICE O/P EST MOD 30 MIN: CPT | Performed by: FAMILY MEDICINE

## 2021-10-14 RX ORDER — AZELASTINE 1 MG/ML
SPRAY, METERED NASAL
COMMUNITY
Start: 2021-10-13 | End: 2022-11-25

## 2021-10-14 RX ORDER — METOPROLOL SUCCINATE 25 MG/1
25 TABLET, EXTENDED RELEASE ORAL DAILY
Qty: 30 TABLET | Refills: 1 | Status: SHIPPED | OUTPATIENT
Start: 2021-10-14 | End: 2021-11-11

## 2021-10-14 ASSESSMENT — PATIENT HEALTH QUESTIONNAIRE - PHQ9: SUM OF ALL RESPONSES TO PHQ QUESTIONS 1-9: 0

## 2021-10-14 ASSESSMENT — MIFFLIN-ST. JEOR: SCORE: 1344.42

## 2021-10-14 NOTE — ASSESSMENT & PLAN NOTE
Blood pressure is good here today but has been elevated intermittently. She also has a family history of high blood pressure. Recheck BMP, TSH, and CBC were normal. Start metoprolol XL 25 mg daily. Recheck in 3 weeks.

## 2021-10-14 NOTE — PATIENT INSTRUCTIONS
1. Metoprolol XL 25 mg daily at bedtime.  2. Follow up for recheck in about 3 weeks.  3. Please call radiology to schedule mammogram: [phone].

## 2021-10-14 NOTE — PROGRESS NOTES
"Assessment/Plan:      Problem List Items Addressed This Visit        Medium    Generalized anxiety disorder     Continue current dosing of sertraline.         Essential hypertension - Primary     Blood pressure is good here today but has been elevated intermittently. She also has a family history of high blood pressure. Recheck BMP, TSH, and CBC were normal. Start metoprolol XL 25 mg daily. Recheck in 3 weeks.         Relevant Medications    metoprolol succinate ER (TOPROL-XL) 25 MG 24 hr tablet      Other Visit Diagnoses     Screening mammogram, encounter for        Relevant Orders    MA Screen Bilateral w/Thaddeus        Patient Instructions   1. Metoprolol XL 25 mg daily at bedtime.  2. Follow up for recheck in about 3 weeks.  3. Please call radiology to schedule mammogram: [phone].            Subjective:   Amee Nettles is a 46 year old person who presents today with concerns about intermittent issues with high blood pressure. She reports that it was 160/110 at the chiropractor last week. By the end of the visit it was 140/100. She has been checking her blood pressure at home and it is running about 120-135 systolic most of the time.    She is also complaining of ongoing \"warbling\" in her left hear. She did see ENT recently and was prescribed azelastine but hasn't started that yet.    She also is getting intermittent headaches that seem to get worse through the day. 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     Patient Active Problem List   Diagnosis     Generalized anxiety disorder     TMJ (temporomandibular joint syndrome)     Recurrent major depressive disorder, in partial remission (H)     Essential hypertension      Past Medical History:   Diagnosis Date     Major depressive disorder, single episode, unspecified     Created by Conversion      History reviewed. No pertinent surgical history.    Review of System: Relevant " "items noted in HPI. ROS otherwise negative.     Objective:     Vitals:    10/14/21 1452   BP: 138/88   BP Location: Left arm   Patient Position: Sitting   Cuff Size: Adult Regular   Pulse: 64   SpO2: 99%   Weight: 69.9 kg (154 lb)   Height: 1.651 m (5' 5\")        Physical Exam  Constitutional:       General: She is not in acute distress.     Appearance: She is not ill-appearing.   Cardiovascular:      Rate and Rhythm: Normal rate and regular rhythm.      Heart sounds: No murmur heard.     Pulmonary:      Effort: Pulmonary effort is normal.      Breath sounds: Normal breath sounds. No wheezing or rhonchi.   Musculoskeletal:      Right lower leg: No edema.      Left lower leg: No edema.   Neurological:      General: No focal deficit present.      Cranial Nerves: No cranial nerve deficit.   Psychiatric:         Mood and Affect: Mood normal.         Behavior: Behavior normal.         Thought Content: Thought content normal.         Judgment: Judgment normal.        "

## 2021-10-17 ENCOUNTER — HEALTH MAINTENANCE LETTER (OUTPATIENT)
Age: 46
End: 2021-10-17

## 2021-11-11 ENCOUNTER — OFFICE VISIT (OUTPATIENT)
Dept: FAMILY MEDICINE | Facility: CLINIC | Age: 46
End: 2021-11-11
Payer: COMMERCIAL

## 2021-11-11 VITALS
TEMPERATURE: 98.1 F | RESPIRATION RATE: 16 BRPM | WEIGHT: 155.13 LBS | HEART RATE: 56 BPM | SYSTOLIC BLOOD PRESSURE: 130 MMHG | BODY MASS INDEX: 25.85 KG/M2 | DIASTOLIC BLOOD PRESSURE: 80 MMHG | HEIGHT: 65 IN

## 2021-11-11 DIAGNOSIS — F41.1 GENERALIZED ANXIETY DISORDER: ICD-10-CM

## 2021-11-11 DIAGNOSIS — I10 ESSENTIAL HYPERTENSION: ICD-10-CM

## 2021-11-11 PROCEDURE — 0064A COVID-19,PF,MODERNA (18+ YRS BOOSTER .25ML): CPT | Performed by: FAMILY MEDICINE

## 2021-11-11 PROCEDURE — 91306 COVID-19,PF,MODERNA (18+ YRS BOOSTER .25ML): CPT | Performed by: FAMILY MEDICINE

## 2021-11-11 PROCEDURE — 99213 OFFICE O/P EST LOW 20 MIN: CPT | Mod: 25 | Performed by: FAMILY MEDICINE

## 2021-11-11 RX ORDER — METOPROLOL SUCCINATE 25 MG/1
25 TABLET, EXTENDED RELEASE ORAL DAILY
Qty: 90 TABLET | Refills: 4 | Status: SHIPPED | OUTPATIENT
Start: 2021-11-11 | End: 2022-11-26

## 2021-11-11 ASSESSMENT — MIFFLIN-ST. JEOR: SCORE: 1349.52

## 2021-11-11 NOTE — PROGRESS NOTES
"Assessment/Plan:      Problem List Items Addressed This Visit        Medium    Generalized anxiety disorder     Doing well. Continue current medications.         Essential hypertension     Doing well. Continue current medications.         Relevant Medications    metoprolol succinate ER (TOPROL-XL) 25 MG 24 hr tablet        Patient Instructions   1. Continue current medications.           Subjective:   Amee Nettles is a 46 year old person who presents today for recheck on her blood pressure. She is tolerating the medication well and reports she is feeling better. She reports that her anxiety is also better with the beta blocker. She is not having any exercise intolerance. No other concerns today.    Patient Active Problem List   Diagnosis     Generalized anxiety disorder     TMJ (temporomandibular joint syndrome)     Recurrent major depressive disorder, in partial remission (H)     Essential hypertension      Past Medical History:   Diagnosis Date     Major depressive disorder, single episode, unspecified     Created by Conversion      No past surgical history on file.    Review of System: Relevant items noted in HPI. ROS otherwise negative.     Objective:     Vitals:    11/11/21 1428   BP: 130/80   BP Location: Left arm   Patient Position: Sitting   Cuff Size: Adult Regular   Pulse: 56   Resp: 16   Temp: 98.1  F (36.7  C)   TempSrc: Oral   Weight: 70.4 kg (155 lb 2 oz)   Height: 1.651 m (5' 5\")        Physical Exam  Constitutional:       General: She is not in acute distress.     Appearance: She is not ill-appearing.   Musculoskeletal:      Right lower leg: No edema.      Left lower leg: No edema.   Neurological:      General: No focal deficit present.      Cranial Nerves: No cranial nerve deficit.   Psychiatric:         Mood and Affect: Mood normal.        "

## 2022-07-24 ENCOUNTER — HEALTH MAINTENANCE LETTER (OUTPATIENT)
Age: 47
End: 2022-07-24

## 2022-07-25 ENCOUNTER — E-VISIT (OUTPATIENT)
Dept: FAMILY MEDICINE | Facility: CLINIC | Age: 47
End: 2022-07-25
Payer: COMMERCIAL

## 2022-07-25 DIAGNOSIS — D48.5 NEOPLASM OF UNCERTAIN BEHAVIOR OF SKIN: Primary | ICD-10-CM

## 2022-07-25 PROCEDURE — 99207 PR NON-BILLABLE SERV PER CHARTING: CPT | Performed by: FAMILY MEDICINE

## 2022-07-25 NOTE — PATIENT INSTRUCTIONS
Thank you for choosing us for your care. I think an in-clinic visit would be best next steps based on your symptoms. Please schedule a clinic appointment; you won t be charged for this eVisit.      You can schedule an appointment right here in [facility], or call [phone]

## 2022-09-26 ENCOUNTER — TRANSFERRED RECORDS (OUTPATIENT)
Dept: HEALTH INFORMATION MANAGEMENT | Facility: CLINIC | Age: 47
End: 2022-09-26

## 2022-10-01 ENCOUNTER — HEALTH MAINTENANCE LETTER (OUTPATIENT)
Age: 47
End: 2022-10-01

## 2022-11-25 DIAGNOSIS — I10 ESSENTIAL HYPERTENSION: ICD-10-CM

## 2022-11-25 DIAGNOSIS — R09.82 POST-NASAL DRIP: Primary | ICD-10-CM

## 2022-11-25 NOTE — TELEPHONE ENCOUNTER
Pending Prescriptions:                       Disp   Refills    metoprolol succinate ER (TOPROL XL) 25 MG*90 tab*4            Sig: Take 1 tablet (25 mg) by mouth daily    azelastine (ASTELIN) 0.1 % nasal spray                      Pt is out of medication.

## 2022-11-26 RX ORDER — METOPROLOL SUCCINATE 25 MG/1
25 TABLET, EXTENDED RELEASE ORAL DAILY
Qty: 90 TABLET | Refills: 0 | Status: SHIPPED | OUTPATIENT
Start: 2022-11-26 | End: 2023-02-24

## 2022-11-26 RX ORDER — AZELASTINE 1 MG/ML
2 SPRAY, METERED NASAL 2 TIMES DAILY
Qty: 30 ML | Refills: 3 | Status: SHIPPED | OUTPATIENT
Start: 2022-11-26 | End: 2024-06-20

## 2022-12-13 ASSESSMENT — ENCOUNTER SYMPTOMS
ABDOMINAL PAIN: 0
ARTHRALGIAS: 0
NERVOUS/ANXIOUS: 0
JOINT SWELLING: 0
CONSTIPATION: 0
NAUSEA: 0
PARESTHESIAS: 0
DYSURIA: 0
FEVER: 0
HEMATURIA: 0
MYALGIAS: 0
PALPITATIONS: 0
SORE THROAT: 0
EYE PAIN: 0
WEAKNESS: 0
SHORTNESS OF BREATH: 0
DIZZINESS: 0
COUGH: 0
DIARRHEA: 0
HEARTBURN: 0
BREAST MASS: 0
HEMATOCHEZIA: 0
FREQUENCY: 0
CHILLS: 0

## 2022-12-14 ENCOUNTER — OFFICE VISIT (OUTPATIENT)
Dept: FAMILY MEDICINE | Facility: CLINIC | Age: 47
End: 2022-12-14
Payer: COMMERCIAL

## 2022-12-14 VITALS
OXYGEN SATURATION: 98 % | TEMPERATURE: 98.9 F | DIASTOLIC BLOOD PRESSURE: 70 MMHG | SYSTOLIC BLOOD PRESSURE: 116 MMHG | WEIGHT: 167.9 LBS | HEIGHT: 65 IN | HEART RATE: 56 BPM | BODY MASS INDEX: 27.97 KG/M2

## 2022-12-14 DIAGNOSIS — Z13.220 SCREENING FOR HYPERLIPIDEMIA: ICD-10-CM

## 2022-12-14 DIAGNOSIS — F41.1 GENERALIZED ANXIETY DISORDER: ICD-10-CM

## 2022-12-14 DIAGNOSIS — Z00.00 ROUTINE HISTORY AND PHYSICAL EXAMINATION OF ADULT: Primary | ICD-10-CM

## 2022-12-14 DIAGNOSIS — Z12.31 VISIT FOR SCREENING MAMMOGRAM: ICD-10-CM

## 2022-12-14 DIAGNOSIS — I10 ESSENTIAL HYPERTENSION: ICD-10-CM

## 2022-12-14 DIAGNOSIS — F32.5 MAJOR DEPRESSION IN REMISSION (H): ICD-10-CM

## 2022-12-14 DIAGNOSIS — Z12.11 SCREEN FOR COLON CANCER: ICD-10-CM

## 2022-12-14 PROBLEM — F33.41 RECURRENT MAJOR DEPRESSIVE DISORDER, IN PARTIAL REMISSION (H): Status: RESOLVED | Noted: 2020-12-16 | Resolved: 2022-12-14

## 2022-12-14 PROBLEM — F10.20 ALCOHOL DEPENDENCE (H): Status: ACTIVE | Noted: 2022-12-14

## 2022-12-14 LAB
ALBUMIN SERPL BCG-MCNC: 4.1 G/DL (ref 3.5–5.2)
ALP SERPL-CCNC: 45 U/L (ref 35–104)
ALT SERPL W P-5'-P-CCNC: 20 U/L (ref 10–35)
ANION GAP SERPL CALCULATED.3IONS-SCNC: 9 MMOL/L (ref 7–15)
AST SERPL W P-5'-P-CCNC: 33 U/L (ref 10–35)
BILIRUB SERPL-MCNC: 0.3 MG/DL
BUN SERPL-MCNC: 17.7 MG/DL (ref 6–20)
CALCIUM SERPL-MCNC: 8.9 MG/DL (ref 8.6–10)
CHLORIDE SERPL-SCNC: 104 MMOL/L (ref 98–107)
CHOLEST SERPL-MCNC: 192 MG/DL
CREAT SERPL-MCNC: 1.04 MG/DL (ref 0.51–0.95)
DEPRECATED HCO3 PLAS-SCNC: 25 MMOL/L (ref 22–29)
GFR SERPL CREATININE-BSD FRML MDRD: 67 ML/MIN/1.73M2
GLUCOSE SERPL-MCNC: 95 MG/DL (ref 70–99)
HDLC SERPL-MCNC: 94 MG/DL
LDLC SERPL CALC-MCNC: 83 MG/DL
NONHDLC SERPL-MCNC: 98 MG/DL
POTASSIUM SERPL-SCNC: 4.5 MMOL/L (ref 3.4–5.3)
PROT SERPL-MCNC: 6.9 G/DL (ref 6.4–8.3)
SODIUM SERPL-SCNC: 138 MMOL/L (ref 136–145)
TRIGL SERPL-MCNC: 75 MG/DL

## 2022-12-14 PROCEDURE — 80053 COMPREHEN METABOLIC PANEL: CPT | Performed by: FAMILY MEDICINE

## 2022-12-14 PROCEDURE — 99396 PREV VISIT EST AGE 40-64: CPT | Performed by: FAMILY MEDICINE

## 2022-12-14 PROCEDURE — 36415 COLL VENOUS BLD VENIPUNCTURE: CPT | Performed by: FAMILY MEDICINE

## 2022-12-14 PROCEDURE — 80061 LIPID PANEL: CPT | Performed by: FAMILY MEDICINE

## 2022-12-14 ASSESSMENT — ENCOUNTER SYMPTOMS
FREQUENCY: 0
WEAKNESS: 0
JOINT SWELLING: 0
DYSURIA: 0
ARTHRALGIAS: 0
DIARRHEA: 0
HEMATOCHEZIA: 0
COUGH: 0
FEVER: 0
MYALGIAS: 0
HEARTBURN: 0
CONSTIPATION: 0
PALPITATIONS: 0
SHORTNESS OF BREATH: 0
CHILLS: 0
SORE THROAT: 0
NERVOUS/ANXIOUS: 0
NAUSEA: 0
EYE PAIN: 0
DIZZINESS: 0
BREAST MASS: 0
ABDOMINAL PAIN: 0
PARESTHESIAS: 0
HEMATURIA: 0

## 2022-12-14 ASSESSMENT — PATIENT HEALTH QUESTIONNAIRE - PHQ9
SUM OF ALL RESPONSES TO PHQ QUESTIONS 1-9: 0
10. IF YOU CHECKED OFF ANY PROBLEMS, HOW DIFFICULT HAVE THESE PROBLEMS MADE IT FOR YOU TO DO YOUR WORK, TAKE CARE OF THINGS AT HOME, OR GET ALONG WITH OTHER PEOPLE: NOT DIFFICULT AT ALL
SUM OF ALL RESPONSES TO PHQ QUESTIONS 1-9: 0

## 2022-12-14 ASSESSMENT — PAIN SCALES - GENERAL: PAINLEVEL: NO PAIN (0)

## 2022-12-14 NOTE — PROGRESS NOTES
SUBJECTIVE:   CC: Amee is an 47 year old who presents for preventive health visit.       Patient has been advised of split billing requirements and indicates understanding: Yes  Healthy Habits:     Getting at least 3 servings of Calcium per day:  Yes    Bi-annual eye exam:  Yes    Dental care twice a year:  Yes    Sleep apnea or symptoms of sleep apnea:  None    Diet:  Regular (no restrictions)    Frequency of exercise:  4-5 days/week    Duration of exercise:  30-45 minutes    Taking medications regularly:  Yes    Medication side effects:  None    PHQ-2 Total Score: 0    Additional concerns today:  No              Today's PHQ-2 Score:   PHQ-2 ( 1999 Pfizer) 12/13/2022   Q1: Little interest or pleasure in doing things 0   Q2: Feeling down, depressed or hopeless 0   PHQ-2 Score 0   Q1: Little interest or pleasure in doing things Not at all   Q2: Feeling down, depressed or hopeless Not at all   PHQ-2 Score 0           Social History     Tobacco Use     Smoking status: Never     Smokeless tobacco: Never   Substance Use Topics     Alcohol use: Yes     Alcohol/week: 21.0 standard drinks         Alcohol Use 12/13/2022   Prescreen: >3 drinks/day or >7 drinks/week? No       Reviewed orders with patient.  Reviewed health maintenance and updated orders accordingly - Yes  Current Outpatient Medications   Medication Sig Dispense Refill     azelastine (ASTELIN) 0.1 % nasal spray Spray 2 sprays into both nostrils 2 times daily 30 mL 3     fluticasone propionate (FLONASE) 50 mcg/actuation nasal spray [FLUTICASONE PROPIONATE (FLONASE) 50 MCG/ACTUATION NASAL SPRAY] SPRAY 1 SPRAY INTO EACH NOSTRIL EVERY DAY 48 g 3     metoprolol succinate ER (TOPROL XL) 25 MG 24 hr tablet Take 1 tablet (25 mg) by mouth daily 90 tablet 0     sertraline (ZOLOFT) 100 MG tablet TAKE 1 TABLET BY MOUTH EVERY DAY 90 tablet 2       Breast Cancer Screening:    Breast CA Risk Assessment (FHS-7) 12/13/2022   Do you have a family history of breast, colon, or  "ovarian cancer? No / Unknown           Pertinent mammograms are reviewed under the imaging tab.    History of abnormal Pap smear: NO - age 30-65 PAP every 5 years with negative HPV co-testing recommended     Reviewed and updated as needed this visit by clinical staff   Tobacco  Allergies  Meds              Reviewed and updated as needed this visit by Provider    Allergies  Meds                 Review of Systems   Constitutional: Negative for chills and fever.   HENT: Negative for congestion, ear pain and sore throat.    Eyes: Negative for pain and visual disturbance.   Respiratory: Negative for cough and shortness of breath.    Cardiovascular: Negative for chest pain, palpitations and peripheral edema.   Gastrointestinal: Negative for abdominal pain, constipation, diarrhea, heartburn, hematochezia and nausea.   Breasts:  Negative for tenderness, breast mass and discharge.   Genitourinary: Negative for dysuria, frequency, genital sores, hematuria, pelvic pain, urgency, vaginal bleeding and vaginal discharge.   Musculoskeletal: Negative for arthralgias, joint swelling and myalgias.   Skin: Negative for rash.   Neurological: Negative for dizziness, weakness and paresthesias.   Psychiatric/Behavioral: Negative for mood changes. The patient is not nervous/anxious.           OBJECTIVE:   /70 (BP Location: Left arm, Cuff Size: Adult Regular)   Pulse 56   Temp 98.9  F (37.2  C) (Temporal)   Ht 1.651 m (5' 5\")   Wt 76.2 kg (167 lb 14.4 oz)   LMP 11/27/2022   SpO2 98%   BMI 27.94 kg/m    Physical Exam  GENERAL: healthy, alert and no distress  EYES: Eyes grossly normal to inspection, PERRL and conjunctivae and sclerae normal  HENT: normal cephalic/atraumatic and ear canals and TM's normal  RESP: lungs clear to auscultation - no rales, rhonchi or wheezes  BREAST: normal without masses, tenderness or nipple discharge and no palpable axillary masses or adenopathy  CV: regular rate and rhythm, normal S1 S2, no S3 " "or S4, no murmur, click or rub, no peripheral edema and peripheral pulses strong  ABDOMEN: soft, nontender, no hepatosplenomegaly, no masses and bowel sounds normal  MS: no gross musculoskeletal defects noted, no edema  SKIN: no suspicious lesions or rashes  NEURO: Normal strength and tone, mentation intact and speech normal  PSYCH: mentation appears normal, affect normal/bright        ASSESSMENT/PLAN:   Amee was seen today for physical.    Diagnoses and all orders for this visit:    Routine history and physical examination of adult    Visit for screening mammogram  -     MA SCREENING DIGITAL BILAT - Future  (s+30); Future    Screen for colon cancer  -     Colonoscopy Screening  Referral; Future    Screening for hyperlipidemia  -     Lipid panel reflex to direct LDL Non-fasting; Future  -     Lipid panel reflex to direct LDL Non-fasting    Essential hypertension  Controlled with current dose of metoprolol.  This will be continued.  Continue with regular exercise, healthy diet and weight loss efforts.  Check CMP and lipids today    Major depression in remission (H)  Continue sertraline 100 mg daily    Generalized anxiety disorder  Continue sertraline 100 mg daily              COUNSELING:  Reviewed preventive health counseling, as reflected in patient instructions      BMI:   Estimated body mass index is 27.94 kg/m  as calculated from the following:    Height as of this encounter: 1.651 m (5' 5\").    Weight as of this encounter: 76.2 kg (167 lb 14.4 oz).   Weight management plan: Discussed healthy diet and exercise guidelines      She reports that she has never smoked. She has never used smokeless tobacco.          Ines Luna MD  [facility]  Answers for HPI/ROS submitted by the patient on 12/14/2022  If you checked off any problems, how difficult have these problems made it for you to do your work, take care of things at home, or get along with other people?: Not difficult at " all  PHQ9 TOTAL SCORE: 0

## 2022-12-28 DIAGNOSIS — F33.41 RECURRENT MAJOR DEPRESSIVE DISORDER, IN PARTIAL REMISSION (H): ICD-10-CM

## 2022-12-29 RX ORDER — SERTRALINE HYDROCHLORIDE 100 MG/1
TABLET, FILM COATED ORAL
Qty: 90 TABLET | Refills: 1 | Status: SHIPPED | OUTPATIENT
Start: 2022-12-29 | End: 2023-06-28

## 2022-12-29 NOTE — TELEPHONE ENCOUNTER
"    Last Written Prescription Date:  3/21/22  Last Fill Quantity: 90,  # refills: 2   Last office visit provider:  12/14/22     Requested Prescriptions   Pending Prescriptions Disp Refills     sertraline (ZOLOFT) 100 MG tablet [Pharmacy Med Name: SERTRALINE  MG TABLET] 90 tablet 2     Sig: TAKE 1 TABLET BY MOUTH EVERY DAY       SSRIs Protocol Failed - 12/28/2022 12:06 PM        Failed - Recent (6 mo) or future (30 days) visit within the authorizing provider's specialty     Patient had office visit in the last 6 months or has a visit in the next 30 days with authorizing provider or within the authorizing provider's specialty.  See \"Patient Info\" tab in inbasket, or \"Choose Columns\" in Meds & Orders section of the refill encounter.            Passed - PHQ-9 score less than 5 in past 6 months     Please review last PHQ-9 score.           Passed - Medication is active on med list        Passed - Patient is age 18 or older        Passed - No active pregnancy on record        Passed - No positive pregnancy test in last 12 months             Tahira Navarro, RN 12/29/22 1:00 PM  "

## 2023-01-05 ENCOUNTER — HOSPITAL ENCOUNTER (OUTPATIENT)
Dept: MAMMOGRAPHY | Facility: CLINIC | Age: 48
Discharge: HOME | End: 2023-01-05
Attending: FAMILY MEDICINE | Admitting: FAMILY MEDICINE
Payer: COMMERCIAL

## 2023-01-05 DIAGNOSIS — Z12.31 VISIT FOR SCREENING MAMMOGRAM: ICD-10-CM

## 2023-01-05 PROCEDURE — 77067 SCR MAMMO BI INCL CAD: CPT

## 2023-02-23 DIAGNOSIS — I10 ESSENTIAL HYPERTENSION: ICD-10-CM

## 2023-02-24 RX ORDER — METOPROLOL SUCCINATE 25 MG/1
TABLET, EXTENDED RELEASE ORAL
Qty: 90 TABLET | Refills: 3 | Status: SHIPPED | OUTPATIENT
Start: 2023-02-24 | End: 2024-04-04

## 2023-02-24 NOTE — TELEPHONE ENCOUNTER
"    Last Written Prescription Date:  11/26/2022  Last Fill Quantity: 90,  # refills: 0   Last office visit provider:  12/14/2022     Requested Prescriptions   Pending Prescriptions Disp Refills     metoprolol succinate ER (TOPROL XL) 25 MG 24 hr tablet [Pharmacy Med Name: METOPROLOL SUCC ER 25 MG TAB] 90 tablet 0     Sig: TAKE 1 TABLET BY MOUTH EVERY DAY       Beta-Blockers Protocol Passed - 2/24/2023 12:17 PM        Passed - Blood pressure under 140/90 in past 12 months     BP Readings from Last 3 Encounters:   12/14/22 116/70   11/11/21 130/80   10/14/21 138/88                 Passed - Patient is age 6 or older        Passed - Recent (12 mo) or future (30 days) visit within the authorizing provider's specialty     Patient has had an office visit with the authorizing provider or a provider within the authorizing providers department within the previous 12 mos or has a future within next 30 days. See \"Patient Info\" tab in inbasket, or \"Choose Columns\" in Meds & Orders section of the refill encounter.              Passed - Medication is active on med list             Gretta Alcala RN 02/24/23 12:18 PM  "

## 2023-06-28 DIAGNOSIS — F33.41 RECURRENT MAJOR DEPRESSIVE DISORDER, IN PARTIAL REMISSION (H): ICD-10-CM

## 2023-06-28 NOTE — TELEPHONE ENCOUNTER
Pending Prescriptions:                       Disp   Refills    sertraline (ZOLOFT) 100 MG tablet         90 tab*1            Sig: Take 1 tablet (100 mg) by mouth daily

## 2023-06-29 RX ORDER — SERTRALINE HYDROCHLORIDE 100 MG/1
100 TABLET, FILM COATED ORAL DAILY
Qty: 90 TABLET | Refills: 3 | Status: SHIPPED | OUTPATIENT
Start: 2023-06-29 | End: 2024-06-20

## 2023-06-29 RX ORDER — SERTRALINE HYDROCHLORIDE 100 MG/1
TABLET, FILM COATED ORAL
Qty: 90 TABLET | Refills: 1 | OUTPATIENT
Start: 2023-06-29

## 2023-06-29 NOTE — TELEPHONE ENCOUNTER
"Routing refill request to provider for review/approval because:  PHQ-9 not current:  Scored 0 on 12/14/22  Patient needs to be seen because it has been more than 6 months since last office visit.    Last Written Prescription Date:  12/29/22  Last Fill Quantity: 90,  # refills: 1  Last office visit provider:  12/14/22     Requested Prescriptions   Pending Prescriptions Disp Refills     sertraline (ZOLOFT) 100 MG tablet 90 tablet 1     Sig: Take 1 tablet (100 mg) by mouth daily       SSRIs Protocol Failed - 6/28/2023 10:25 AM        Failed - PHQ-9 score less than 5 in past 6 months     Please review last PHQ-9 score.           Failed - Recent (6 mo) or future (30 days) visit within the authorizing provider's specialty     Patient had office visit in the last 6 months or has a visit in the next 30 days with authorizing provider or within the authorizing provider's specialty.  See \"Patient Info\" tab in inbasket, or \"Choose Columns\" in Meds & Orders section of the refill encounter.            Passed - Medication is active on med list        Passed - Patient is age 18 or older        Passed - No active pregnancy on record        Passed - No positive pregnancy test in last 12 months             Sindy Green RN 06/29/23 6:11 AM  "

## 2023-11-15 ENCOUNTER — NURSE TRIAGE (OUTPATIENT)
Dept: FAMILY MEDICINE | Facility: CLINIC | Age: 48
End: 2023-11-15
Payer: COMMERCIAL

## 2023-11-15 NOTE — TELEPHONE ENCOUNTER
Nurse Triage SBAR    Is this a 2nd Level Triage? 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 Have not been back to work yet and will need a doctor's note to excuse pt through tomorrow.     Background:   No known exposure to anyone with flu or COVID infections.    Sons had sore throat and headache, but both were negative for COVID infection.    Denied asthma or COPD.  Denied being diabetic.  Denied being immunosuppressed.    Assessment:   Fever 99 to 100.5 at night.  Bodyache.  Headache.  Dizziness.  Tones of crud.  Tired d/t not being able to sleep.  Productive cough that is green and clear. No blood present.  No longer has chills.  Denied SOB/difficulty breathing.  Denied chest pain.  Runny nose and congestion- clear discharge.  Been having post nasal drip that irritated throat, but not sore throat.     Been alternating between Tylenol and IBU.   Currently on Amoxicillin for current tooth infection. Sinus issues so she was seen at ENT and placed on Amoxicillin. CT scan in a few weeks.    Leaving on for a cruise this Friday.    Protocol Recommended Disposition:   Home Care    Recommendation:   Care advice given. Routing to covering provider to see if dr's notes could be provided via [employer] to excuse pt through tomorrow.    Patient advised to take a home COVID test and to call to talk to a nurse if positive. Patient agreed.    Patient advised to submit e-visit for her symptoms and she stated she could do that, but routing to covering provider in the mean time.    Routed to provider    Does the patient meet one of the following criteria for ADS visit consideration? 16+ years old, with an [facility] PCP     TIP  Providers, please consider if this condition is appropriate for management at one of our Acute and Diagnostic Services sites.     If patient is a good candidate, please use dotphrase <dot>triageresponse and  select Refer to ADS to document.    Reason for Disposition   Colds with no complications    Additional Information   Negative: SEVERE difficulty breathing (e.g., struggling for each breath, speaks in single words)   Negative: Very weak (can't stand)   Negative: Sounds like a life-threatening emergency to the triager   Negative: Difficulty breathing and not from stuffy nose (e.g., not relieved by cleaning out the nose)   Negative: Runny nose is caused by pollen or other allergies   Negative: Cough is main symptom   Negative: Sore throat is main symptom   Negative: Patient sounds very sick or weak to the triager   Negative: Fever > 103 F (39.4 C)   Negative: Fever > 101 F (38.3 C) and over 60 years of age   Negative: Fever > 100.0 F (37.8 C) and has diabetes mellitus or a weak immune system (e.g., HIV positive, cancer chemotherapy, organ transplant, splenectomy, chronic steroids)   Negative: Fever > 100.0 F (37.8 C) and bedridden (e.g., CVA, chronic illness, recovering from surgery)   Negative: Fever present > 3 days (72 hours)   Negative: Fever returns after gone for over 24 hours and symptoms worse or not improved   Negative: Sinus pain (not just congestion) and fever   Negative: Earache   Negative: Sinus congestion (pressure, fullness) present > 10 days   Negative: Nasal discharge present > 10 days   Negative: Using nasal washes and pain medicine > 24 hours and sinus pain (lower forehead, cheekbone, or eye) persists   Negative: Patient wants to be seen   Negative: Sore throat present > 5 days    Protocols used: Common Cold-A-OH    LEAH BianchiN, RN  [facility]

## 2023-11-15 NOTE — TELEPHONE ENCOUNTER
Letter sent.  She should remain out of work until fever free without need for ibuprofen or Tylenol for 24 hours.  ELOISEJ

## 2023-11-15 NOTE — LETTER
November 15, 2023      Amee Nettles  [address]  [place] [facility] [address]        To Whom It May Concern:      Amee Nettles was given medical advice for illness, though evaluation in clinic was not required.  Please excuse her from work through 11/16/2023 due to illness.        Sincerely,        Madison Laird MD

## 2023-11-15 NOTE — TELEPHONE ENCOUNTER
Provider Recommendation Follow Up:   Reached patient/caregiver. Informed of provider's recommendations. Patient verbalized understanding and agrees with the plan.     No other questions or concerns at this time.    LEAH BianchiN, RN  [facility]

## 2023-11-16 ENCOUNTER — E-VISIT (OUTPATIENT)
Dept: URGENT CARE | Facility: CLINIC | Age: 48
End: 2023-11-16
Payer: COMMERCIAL

## 2023-11-16 ENCOUNTER — LAB (OUTPATIENT)
Dept: LAB | Facility: CLINIC | Age: 48
End: 2023-11-16
Attending: PHYSICIAN ASSISTANT
Payer: COMMERCIAL

## 2023-11-16 ENCOUNTER — NURSE TRIAGE (OUTPATIENT)
Dept: NURSING | Facility: CLINIC | Age: 48
End: 2023-11-16
Payer: COMMERCIAL

## 2023-11-16 DIAGNOSIS — Z20.822 SUSPECTED COVID-19 VIRUS INFECTION: ICD-10-CM

## 2023-11-16 DIAGNOSIS — J02.9 SORE THROAT: ICD-10-CM

## 2023-11-16 DIAGNOSIS — Z20.822 SUSPECTED COVID-19 VIRUS INFECTION: Primary | ICD-10-CM

## 2023-11-16 DIAGNOSIS — Z11.4 SCREENING FOR HIV (HUMAN IMMUNODEFICIENCY VIRUS): Primary | ICD-10-CM

## 2023-11-16 LAB
DEPRECATED S PYO AG THROAT QL EIA: NEGATIVE
GROUP A STREP BY PCR: NOT DETECTED

## 2023-11-16 PROCEDURE — 87635 SARS-COV-2 COVID-19 AMP PRB: CPT

## 2023-11-16 PROCEDURE — 99421 OL DIG E/M SVC 5-10 MIN: CPT | Performed by: PHYSICIAN ASSISTANT

## 2023-11-16 PROCEDURE — 87651 STREP A DNA AMP PROBE: CPT

## 2023-11-16 NOTE — TELEPHONE ENCOUNTER
Ended up calling yesterday. Still has a fever since Monday. Needed MD note, cough, headache, body aches. Last night tested for covid-19, positive. Last night told to call back to handle covid-19. She's had 11-13-23.      I connected with scheduling for a virtual visit to discuss covid treatment options and to get  a note for work.    COVID Positive/Requesting COVID treatment    Patient is positive for COVID and requesting treatment options.    Date of positive COVID test (PCR or at home)? 11-15-23  Current COVID symptoms: cough, fatigue, headache, and congestion or runny nose  Date COVID symptoms began: 11-13-23    Message should be routed to clinic RN pool. Best phone number to use for call back: [phone].    Polly De La Cruz RN  [facility]    Reason for Disposition   HIGH RISK patient (e.g., weak immune system, age > 64 years, obesity with BMI of 30 or higher, pregnant, chronic lung disease or other chronic medical condition) and COVID symptoms (e.g., cough, fever)  (Exceptions: Already seen by doctor or NP/PA and no new or worsening symptoms.)    Additional Information   Negative: SEVERE difficulty breathing (e.g., struggling for each breath, speaks in single words)   Negative: Difficult to awaken or acting confused (e.g., disoriented, slurred speech)   Negative: Bluish (or gray) lips or face now   Negative: Shock suspected (e.g., cold/pale/clammy skin, too weak to stand, low BP, rapid pulse)   Negative: Sounds like a life-threatening emergency to the triager   Negative: Diagnosed or suspected COVID-19 and symptoms lasting 3 or more weeks   Negative: COVID-19 exposure and no symptoms   Negative: COVID-19 vaccine reaction suspected (e.g., fever, headache, muscle aches) occurring 1 to 3 days after getting vaccine   Negative: COVID-19 vaccine, questions about   Negative: Lives with someone known to have influenza (flu test positive) and flu-like symptoms (e.g., cough, runny nose, sore throat, SOB;  with or without fever)   Negative: Possible COVID-19 symptoms and triager concerned about severity of symptoms or other causes   Negative: COVID-19 and breastfeeding, questions about   Negative: SEVERE or constant chest pain or pressure  (Exception: Mild central chest pain, present only when coughing.)   Negative: MODERATE difficulty breathing (e.g., speaks in phrases, SOB even at rest, pulse 100-120)   Negative: Headache and stiff neck (can't touch chin to chest)     headache   Negative: Oxygen level (e.g., pulse oximetry) 90% or lower   Negative: Chest pain or pressure  (Exception: MILD central chest pain, present only when coughing.)   Negative: Drinking very little and dehydration suspected (e.g., no urine > 12 hours, very dry mouth, very lightheaded)   Negative: Patient sounds very sick or weak to the triager   Negative: MILD difficulty breathing (e.g., minimal/no SOB at rest, SOB with walking, pulse <100)   Negative: Fever > 103 F (39.4 C)   Negative: Fever > 101 F (38.3 C) and over 60 years of age   Negative: Fever > 100.0 F (37.8 C) and bedridden (e.g., CVA, chronic illness, recovering from surgery)    Protocols used: Coronavirus (COVID-19) Diagnosed or [id number]

## 2023-11-17 ENCOUNTER — TELEPHONE (OUTPATIENT)
Dept: NURSING | Facility: CLINIC | Age: 48
End: 2023-11-17
Payer: COMMERCIAL

## 2023-11-17 LAB — SARS-COV-2 RNA RESP QL NAA+PROBE: POSITIVE

## 2023-11-17 NOTE — TELEPHONE ENCOUNTER
Patient classified as COVID treatment eligible by Epic high risk algorithm:  No    Coronavirus (COVID-19) Notification    Reason for call  Notify of POSITIVE COVID-19 lab result, assess symptoms,  review [facility] recommendations    Lab Result   Lab test for 2019-nCoV rRt-PCR or SARS-COV-2 PCR  Oropharyngeal AND/OR nasopharyngeal swabs were POSITIVE for 2019-nCoV RNA [OR] SARS-COV-2 RNA (COVID-19) RNA     We have been unable to reach patient by phone at this time to notify of their Positive COVID-19 result.    Left voicemail message requesting a call back to [phone] [facility] for results.        A Positive COVID-19 letter will be sent via [employer] or the mail.    HUGH CHACON

## 2023-12-08 ENCOUNTER — TRANSFERRED RECORDS (OUTPATIENT)
Dept: HEALTH INFORMATION MANAGEMENT | Facility: CLINIC | Age: 48
End: 2023-12-08
Payer: COMMERCIAL

## 2023-12-11 ENCOUNTER — PATIENT OUTREACH (OUTPATIENT)
Dept: CARE COORDINATION | Facility: CLINIC | Age: 48
End: 2023-12-11
Payer: COMMERCIAL

## 2024-01-09 ENCOUNTER — LAB REQUISITION (OUTPATIENT)
Dept: LAB | Facility: CLINIC | Age: 49
End: 2024-01-09

## 2024-01-09 DIAGNOSIS — Z01.419 ENCOUNTER FOR GYNECOLOGICAL EXAMINATION (GENERAL) (ROUTINE) WITHOUT ABNORMAL FINDINGS: ICD-10-CM

## 2024-01-09 PROCEDURE — 88305 TISSUE EXAM BY PATHOLOGIST: CPT | Performed by: STUDENT IN AN ORGANIZED HEALTH CARE EDUCATION/TRAINING PROGRAM

## 2024-01-11 ENCOUNTER — LAB REQUISITION (OUTPATIENT)
Dept: LAB | Facility: CLINIC | Age: 49
End: 2024-01-11

## 2024-01-11 DIAGNOSIS — Z13.220 ENCOUNTER FOR SCREENING FOR LIPOID DISORDERS: ICD-10-CM

## 2024-01-11 DIAGNOSIS — Z13.0 ENCOUNTER FOR SCREENING FOR DISEASES OF THE BLOOD AND BLOOD-FORMING ORGANS AND CERTAIN DISORDERS INVOLVING THE IMMUNE MECHANISM: ICD-10-CM

## 2024-01-11 DIAGNOSIS — Z13.29 ENCOUNTER FOR SCREENING FOR OTHER SUSPECTED ENDOCRINE DISORDER: ICD-10-CM

## 2024-01-11 DIAGNOSIS — Z13.1 ENCOUNTER FOR SCREENING FOR DIABETES MELLITUS: ICD-10-CM

## 2024-01-11 LAB
ERYTHROCYTE [DISTWIDTH] IN BLOOD BY AUTOMATED COUNT: 13.4 % (ref 10–15)
HBA1C MFR BLD: 5.2 %
HCT VFR BLD AUTO: 38 % (ref 35–47)
HGB BLD-MCNC: 12.1 G/DL (ref 11.7–15.7)
MCH RBC QN AUTO: 30.3 PG (ref 26.5–33)
MCHC RBC AUTO-ENTMCNC: 31.8 G/DL (ref 31.5–36.5)
MCV RBC AUTO: 95 FL (ref 78–100)
PATH REPORT.COMMENTS IMP SPEC: NORMAL
PATH REPORT.COMMENTS IMP SPEC: NORMAL
PATH REPORT.FINAL DX SPEC: NORMAL
PATH REPORT.GROSS SPEC: NORMAL
PATH REPORT.MICROSCOPIC SPEC OTHER STN: NORMAL
PATH REPORT.RELEVANT HX SPEC: NORMAL
PHOTO IMAGE: NORMAL
PLATELET # BLD AUTO: 209 10E3/UL (ref 150–450)
RBC # BLD AUTO: 4 10E6/UL (ref 3.8–5.2)
WBC # BLD AUTO: 6.2 10E3/UL (ref 4–11)

## 2024-01-11 PROCEDURE — 84443 ASSAY THYROID STIM HORMONE: CPT | Performed by: OBSTETRICS & GYNECOLOGY

## 2024-01-11 PROCEDURE — 83036 HEMOGLOBIN GLYCOSYLATED A1C: CPT | Performed by: OBSTETRICS & GYNECOLOGY

## 2024-01-11 PROCEDURE — 82465 ASSAY BLD/SERUM CHOLESTEROL: CPT | Performed by: OBSTETRICS & GYNECOLOGY

## 2024-01-11 PROCEDURE — 80053 COMPREHEN METABOLIC PANEL: CPT | Performed by: OBSTETRICS & GYNECOLOGY

## 2024-01-11 PROCEDURE — 85014 HEMATOCRIT: CPT | Performed by: OBSTETRICS & GYNECOLOGY

## 2024-01-12 LAB
ALBUMIN SERPL BCG-MCNC: 4.2 G/DL (ref 3.5–5.2)
ALP SERPL-CCNC: 42 U/L (ref 40–150)
ALT SERPL W P-5'-P-CCNC: 18 U/L (ref 0–50)
ANION GAP SERPL CALCULATED.3IONS-SCNC: 11 MMOL/L (ref 7–15)
AST SERPL W P-5'-P-CCNC: 27 U/L (ref 0–45)
BILIRUB SERPL-MCNC: 0.5 MG/DL
BUN SERPL-MCNC: 12.8 MG/DL (ref 6–20)
CALCIUM SERPL-MCNC: 8.8 MG/DL (ref 8.6–10)
CHLORIDE SERPL-SCNC: 102 MMOL/L (ref 98–107)
CHOLEST SERPL-MCNC: 227 MG/DL
CREAT SERPL-MCNC: 0.93 MG/DL (ref 0.51–0.95)
DEPRECATED HCO3 PLAS-SCNC: 23 MMOL/L (ref 22–29)
EGFRCR SERPLBLD CKD-EPI 2021: 76 ML/MIN/1.73M2
FASTING STATUS PATIENT QL REPORTED: YES
GLUCOSE SERPL-MCNC: 84 MG/DL (ref 70–99)
HDLC SERPL-MCNC: 97 MG/DL
LDLC SERPL CALC-MCNC: 119 MG/DL
NONHDLC SERPL-MCNC: 130 MG/DL
POTASSIUM SERPL-SCNC: 4.8 MMOL/L (ref 3.4–5.3)
PROT SERPL-MCNC: 7.3 G/DL (ref 6.4–8.3)
SODIUM SERPL-SCNC: 136 MMOL/L (ref 135–145)
TRIGL SERPL-MCNC: 56 MG/DL
TSH SERPL DL<=0.005 MIU/L-ACNC: 3.07 UIU/ML (ref 0.3–4.2)

## 2024-03-09 ENCOUNTER — HEALTH MAINTENANCE LETTER (OUTPATIENT)
Age: 49
End: 2024-03-09

## 2024-03-26 DIAGNOSIS — I10 ESSENTIAL HYPERTENSION: ICD-10-CM

## 2024-03-29 NOTE — TELEPHONE ENCOUNTER
Pt hasn't been seen in over a year - is she seeking care elsewhere? Otherwise needs apt    Dr Martin

## 2024-04-04 RX ORDER — METOPROLOL SUCCINATE 25 MG/1
25 TABLET, EXTENDED RELEASE ORAL DAILY
Qty: 30 TABLET | Refills: 0 | Status: SHIPPED | OUTPATIENT
Start: 2024-04-04 | End: 2024-05-21

## 2024-04-04 NOTE — TELEPHONE ENCOUNTER
Called pt and LM relaying info from Dr. Laird and requested pt make an appointment with a new provider.

## 2024-04-04 NOTE — TELEPHONE ENCOUNTER
It is not appropriate to stop metoprolol suddenly (risk of sudden stopping likely outweighs risk of delay in follow-up) so I have sent in a 30 day supply with note on Rx that a visit is needed.  Please continue to attempt to contact patient or send My[facility] message and letter.  VJ

## 2024-04-04 NOTE — TELEPHONE ENCOUNTER
Left detailed message for patient to call back. This is the 3rd and final attempt. Please refuse medication refill.

## 2024-05-21 DIAGNOSIS — I10 ESSENTIAL HYPERTENSION: ICD-10-CM

## 2024-05-21 RX ORDER — METOPROLOL SUCCINATE 25 MG/1
TABLET, EXTENDED RELEASE ORAL
Qty: 10 TABLET | Refills: 0 | Status: SHIPPED | OUTPATIENT
Start: 2024-05-21 | End: 2024-06-04

## 2024-05-21 NOTE — TELEPHONE ENCOUNTER
Please try to call her again.  10 day supply provided.  Needs to establish care with a new provider.  I can bridge her until appt, but needs and appt scheduled.  VJ

## 2024-06-04 ENCOUNTER — MYC REFILL (OUTPATIENT)
Dept: FAMILY MEDICINE | Facility: CLINIC | Age: 49
End: 2024-06-04
Payer: COMMERCIAL

## 2024-06-04 DIAGNOSIS — I10 ESSENTIAL HYPERTENSION: ICD-10-CM

## 2024-06-04 RX ORDER — METOPROLOL SUCCINATE 25 MG/1
25 TABLET, EXTENDED RELEASE ORAL DAILY
Qty: 30 TABLET | Refills: 0 | Status: SHIPPED | OUTPATIENT
Start: 2024-06-04 | End: 2024-06-20

## 2024-06-20 ENCOUNTER — OFFICE VISIT (OUTPATIENT)
Dept: FAMILY MEDICINE | Facility: CLINIC | Age: 49
End: 2024-06-20
Payer: COMMERCIAL

## 2024-06-20 VITALS
RESPIRATION RATE: 18 BRPM | HEIGHT: 65 IN | WEIGHT: 162 LBS | TEMPERATURE: 97.2 F | DIASTOLIC BLOOD PRESSURE: 70 MMHG | SYSTOLIC BLOOD PRESSURE: 110 MMHG | HEART RATE: 60 BPM | OXYGEN SATURATION: 99 % | BODY MASS INDEX: 26.99 KG/M2

## 2024-06-20 DIAGNOSIS — I10 ESSENTIAL HYPERTENSION: ICD-10-CM

## 2024-06-20 DIAGNOSIS — Z76.89 ENCOUNTER TO ESTABLISH CARE: Primary | ICD-10-CM

## 2024-06-20 DIAGNOSIS — F41.1 GENERALIZED ANXIETY DISORDER: ICD-10-CM

## 2024-06-20 DIAGNOSIS — Z12.11 SCREEN FOR COLON CANCER: ICD-10-CM

## 2024-06-20 PROCEDURE — 99214 OFFICE O/P EST MOD 30 MIN: CPT | Performed by: FAMILY MEDICINE

## 2024-06-20 RX ORDER — SERTRALINE HYDROCHLORIDE 100 MG/1
100 TABLET, FILM COATED ORAL DAILY
Qty: 90 TABLET | Refills: 4 | Status: SHIPPED | OUTPATIENT
Start: 2024-06-20

## 2024-06-20 RX ORDER — METOPROLOL SUCCINATE 25 MG/1
25 TABLET, EXTENDED RELEASE ORAL DAILY
Qty: 9 TABLET | Refills: 4 | Status: SHIPPED | OUTPATIENT
Start: 2024-06-20 | End: 2024-07-10

## 2024-06-20 ASSESSMENT — ANXIETY QUESTIONNAIRES
1. FEELING NERVOUS, ANXIOUS, OR ON EDGE: NOT AT ALL
GAD7 TOTAL SCORE: 0
GAD7 TOTAL SCORE: 0
IF YOU CHECKED OFF ANY PROBLEMS ON THIS QUESTIONNAIRE, HOW DIFFICULT HAVE THESE PROBLEMS MADE IT FOR YOU TO DO YOUR WORK, TAKE CARE OF THINGS AT HOME, OR GET ALONG WITH OTHER PEOPLE: NOT DIFFICULT AT ALL
5. BEING SO RESTLESS THAT IT IS HARD TO SIT STILL: NOT AT ALL
8. IF YOU CHECKED OFF ANY PROBLEMS, HOW DIFFICULT HAVE THESE MADE IT FOR YOU TO DO YOUR WORK, TAKE CARE OF THINGS AT HOME, OR GET ALONG WITH OTHER PEOPLE?: NOT DIFFICULT AT ALL
2. NOT BEING ABLE TO STOP OR CONTROL WORRYING: NOT AT ALL
3. WORRYING TOO MUCH ABOUT DIFFERENT THINGS: NOT AT ALL
7. FEELING AFRAID AS IF SOMETHING AWFUL MIGHT HAPPEN: NOT AT ALL
6. BECOMING EASILY ANNOYED OR IRRITABLE: NOT AT ALL
4. TROUBLE RELAXING: NOT AT ALL
7. FEELING AFRAID AS IF SOMETHING AWFUL MIGHT HAPPEN: NOT AT ALL
GAD7 TOTAL SCORE: 0

## 2024-06-20 ASSESSMENT — PAIN SCALES - GENERAL: PAINLEVEL: NO PAIN (0)

## 2024-06-20 NOTE — PROGRESS NOTES
"  Assessment & Plan     Encounter to establish care  Past medical history, surgical history, social history, and family history reviewed and updated in EHR.  She continues to receive gynecologic care from Dr. Stevens.    Essential hypertension  Stable and controlled on metoprolol at current dose.  This is also been helpful for anxiety.  Continue.  - metoprolol succinate ER (TOPROL XL) 25 MG 24 hr tablet; Take 1 tablet (25 mg) by mouth daily    Generalized anxiety disorder  Stable and controlled on sertraline at current dose.  Refill provided.  - sertraline (ZOLOFT) 100 MG tablet; Take 1 tablet (100 mg) by mouth daily    Screen for colon cancer  - Colonoscopy Screening  Referral; Future          BMI  Estimated body mass index is 26.96 kg/m  as calculated from the following:    Height as of this encounter: 1.651 m (5' 5\").    Weight as of this encounter: 73.5 kg (162 lb).             Audi Lubin is a 48 year old, presenting for the following health issues:  Medication renewals (Apt needed for further refills, no refills needed today, just future )    Here to establish care and for ongoing management of her hypertension and anxiety.  Hypertension diagnosed a few years ago, also had elevated heart rate and therefore metoprolol was started.  Doing well on this dose.  Denies lightheadedness, dizziness, headaches, chest pain, dyspnea, or swelling.  History of generalized anxiety for many years preceding birth of her youngest son who is now 13.  Tolerating very well and feeling in good control overall.  Exercising with [employer] and weights.  Overall healthy diet.  Receives gynecologic care through Dr. Stevens's office, they are working on managing menorrhagia and possibly pursuing ablation, Pap up-to-date, discussed mammogram every 1 to 2 years.  Needing colonoscopy, agreeable to scheduling.  Agreeable to future HIV screening.    She is .  Sons ages 13 and 16.  They have a cabin.  Recent trip to [place].  Middle school  and also violinist.    History of Present Illness       Mental Health Follow-up:  Patient presents to follow-up on Anxiety.    Patient's anxiety since last visit has been:  Good  The patient is not having other symptoms associated with anxiety.  Any significant life events: No  Patient is not feeling anxious or having panic attacks.  Patient has no concerns about alcohol or drug use.    Hypertension: She presents for follow up of hypertension.  She does not check blood pressure  regularly outside of the clinic. Outside blood pressures have been over 140/90. She does not follow a low salt diet.     She eats 2-3 servings of fruits and vegetables daily.She consumes 1 sweetened beverage(s) daily.She exercises with enough effort to increase her heart rate 20 to 29 minutes per day.  She exercises with enough effort to increase her heart rate 4 days per week.   She is taking medications regularly.                     Objective    /70   Pulse 60   Temp 97.2  F (36.2  C) (Temporal)   Resp 18   Ht 1.651 m (5' 5\")   Wt 73.5 kg (162 lb)   SpO2 99%   BMI 26.96 kg/m    Body mass index is 26.96 kg/m .  Physical Exam   Alert and very pleasant, appropriate affect.  Mucous membranes moist.  Heart with regular rate and rhythm.  Lungs clear.  Extremities without edema.            Signed Electronically by: Madison Laird MD    "

## 2024-07-10 ENCOUNTER — MYC MEDICAL ADVICE (OUTPATIENT)
Dept: FAMILY MEDICINE | Facility: CLINIC | Age: 49
End: 2024-07-10
Payer: COMMERCIAL

## 2024-07-10 DIAGNOSIS — I10 ESSENTIAL HYPERTENSION: ICD-10-CM

## 2024-07-10 RX ORDER — METOPROLOL SUCCINATE 25 MG/1
25 TABLET, EXTENDED RELEASE ORAL DAILY
Qty: 90 TABLET | Refills: 4 | Status: SHIPPED | OUTPATIENT
Start: 2024-07-10 | End: 2024-08-26 | Stop reason: ALTCHOICE

## 2024-07-24 ENCOUNTER — TRANSFERRED RECORDS (OUTPATIENT)
Dept: HEALTH INFORMATION MANAGEMENT | Facility: CLINIC | Age: 49
End: 2024-07-24
Payer: COMMERCIAL

## 2024-07-30 ENCOUNTER — TELEPHONE (OUTPATIENT)
Dept: FAMILY MEDICINE | Facility: CLINIC | Age: 49
End: 2024-07-30
Payer: COMMERCIAL

## 2024-07-30 NOTE — TELEPHONE ENCOUNTER
Left message to call back for: pt  Information to relay to patient: please help schedule appointment with HI Siegel CNP upon return call.

## 2024-08-06 ENCOUNTER — TRANSFERRED RECORDS (OUTPATIENT)
Dept: HEALTH INFORMATION MANAGEMENT | Facility: CLINIC | Age: 49
End: 2024-08-06
Payer: COMMERCIAL

## 2024-08-20 SDOH — HEALTH STABILITY: PHYSICAL HEALTH: ON AVERAGE, HOW MANY DAYS PER WEEK DO YOU ENGAGE IN MODERATE TO STRENUOUS EXERCISE (LIKE A BRISK WALK)?: 4 DAYS

## 2024-08-20 SDOH — HEALTH STABILITY: PHYSICAL HEALTH: ON AVERAGE, HOW MANY MINUTES DO YOU ENGAGE IN EXERCISE AT THIS LEVEL?: 30 MIN

## 2024-08-20 ASSESSMENT — SOCIAL DETERMINANTS OF HEALTH (SDOH): HOW OFTEN DO YOU GET TOGETHER WITH FRIENDS OR RELATIVES?: ONCE A WEEK

## 2024-08-26 ENCOUNTER — OFFICE VISIT (OUTPATIENT)
Dept: FAMILY MEDICINE | Facility: CLINIC | Age: 49
End: 2024-08-26
Payer: COMMERCIAL

## 2024-08-26 VITALS
HEART RATE: 60 BPM | HEIGHT: 65 IN | TEMPERATURE: 98 F | WEIGHT: 169 LBS | OXYGEN SATURATION: 98 % | BODY MASS INDEX: 28.16 KG/M2 | SYSTOLIC BLOOD PRESSURE: 149 MMHG | RESPIRATION RATE: 16 BRPM | DIASTOLIC BLOOD PRESSURE: 96 MMHG

## 2024-08-26 DIAGNOSIS — F41.1 GENERALIZED ANXIETY DISORDER: ICD-10-CM

## 2024-08-26 DIAGNOSIS — D12.4 BENIGN NEOPLASM OF DESCENDING COLON: ICD-10-CM

## 2024-08-26 DIAGNOSIS — I10 ESSENTIAL HYPERTENSION: ICD-10-CM

## 2024-08-26 DIAGNOSIS — Z82.62 FAMILY HISTORY OF OSTEOPOROSIS: ICD-10-CM

## 2024-08-26 DIAGNOSIS — Z00.00 ROUTINE GENERAL MEDICAL EXAMINATION AT A HEALTH CARE FACILITY: Primary | ICD-10-CM

## 2024-08-26 PROBLEM — F10.20 ALCOHOL DEPENDENCE (H): Status: RESOLVED | Noted: 2022-12-14 | Resolved: 2024-08-26

## 2024-08-26 LAB
ALBUMIN SERPL BCG-MCNC: 4.4 G/DL (ref 3.5–5.2)
ALP SERPL-CCNC: 55 U/L (ref 40–150)
ALT SERPL W P-5'-P-CCNC: 16 U/L (ref 0–50)
ANION GAP SERPL CALCULATED.3IONS-SCNC: 11 MMOL/L (ref 7–15)
AST SERPL W P-5'-P-CCNC: 32 U/L (ref 0–45)
BILIRUB SERPL-MCNC: 0.3 MG/DL
BUN SERPL-MCNC: 19.2 MG/DL (ref 6–20)
CALCIUM SERPL-MCNC: 9 MG/DL (ref 8.8–10.4)
CHLORIDE SERPL-SCNC: 104 MMOL/L (ref 98–107)
CREAT SERPL-MCNC: 0.88 MG/DL (ref 0.51–0.95)
EGFRCR SERPLBLD CKD-EPI 2021: 81 ML/MIN/1.73M2
GLUCOSE SERPL-MCNC: 88 MG/DL (ref 70–99)
HCO3 SERPL-SCNC: 24 MMOL/L (ref 22–29)
POTASSIUM SERPL-SCNC: 4.5 MMOL/L (ref 3.4–5.3)
PROT SERPL-MCNC: 7.6 G/DL (ref 6.4–8.3)
SODIUM SERPL-SCNC: 139 MMOL/L (ref 135–145)

## 2024-08-26 PROCEDURE — 80053 COMPREHEN METABOLIC PANEL: CPT

## 2024-08-26 PROCEDURE — 99214 OFFICE O/P EST MOD 30 MIN: CPT | Mod: 25

## 2024-08-26 PROCEDURE — 99396 PREV VISIT EST AGE 40-64: CPT | Mod: 25

## 2024-08-26 PROCEDURE — 36415 COLL VENOUS BLD VENIPUNCTURE: CPT

## 2024-08-26 RX ORDER — LOSARTAN POTASSIUM 25 MG/1
25 TABLET ORAL DAILY
Qty: 90 TABLET | Refills: 1 | Status: SHIPPED | OUTPATIENT
Start: 2024-08-26

## 2024-08-26 SDOH — HEALTH STABILITY: PHYSICAL HEALTH: ON AVERAGE, HOW MANY MINUTES DO YOU ENGAGE IN EXERCISE AT THIS LEVEL?: 30 MIN

## 2024-08-26 SDOH — HEALTH STABILITY: PHYSICAL HEALTH: ON AVERAGE, HOW MANY DAYS PER WEEK DO YOU ENGAGE IN MODERATE TO STRENUOUS EXERCISE (LIKE A BRISK WALK)?: 4 DAYS

## 2024-08-26 ASSESSMENT — SOCIAL DETERMINANTS OF HEALTH (SDOH): HOW OFTEN DO YOU GET TOGETHER WITH FRIENDS OR RELATIVES?: ONCE A WEEK

## 2024-08-26 NOTE — ASSESSMENT & PLAN NOTE
Annual exam.  Mammogram: Ordered  PAP: UTD  Colonoscopy: UTD  Immunizations: UTD. Discussed seasonal immunizations.  Labs: Discussed and offered.  Mood: As documented.  BMI: 28.56. Discussed diet and exercise  Bone health: DEXA not indicated. Discussed bone health principles  Contraception:  with vasectomy  Tobacco/ETOH: No tobacco. Recommend reduction in ETOH consumption.  Recommend follow up in one year for annual exam or sooner if needed/indicated elsewhere.

## 2024-08-26 NOTE — ASSESSMENT & PLAN NOTE
Current therapies include sertraline 100 mg daily.  She notes today that symptoms are relatively well-controlled.  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  We ultimately decided not to make any therapy adjustments at this time, however I have asked she follow up in the coming months especially if symptoms worsen with our planned change in antihypertensive management. Could consider addition of Wellbutrin or trial of different selective serotonin reuptake inhibitor. We had a good conversation as it pertains to lifestyle and mental health as well as stress management.

## 2024-08-26 NOTE — ASSESSMENT & PLAN NOTE
Patient's blood pressure today is above goal at 149/96.  Current therapies include metoprolol 25 mg XR daily.  She tolerates this medication well without significant side effects, however notes that her blood pressure has been elevated in multiple instances.  Chart review and her report suggest metoprolol was chosen for its anxiolytic properties.  I would suggest a trial of losartan in place of metoprolol she is amenable. Expected therapeutic effects and potential side effects were discussed today.  I have asked that she return for a nurse only blood pressure check in 3 to 4 weeks after starting this new therapy and she will need updated labs once control is achieved. Monitoring labs obtained today. If she has exacerbation of anxiety symptoms with this, we will reconnect to discuss.

## 2024-08-26 NOTE — PROGRESS NOTES
Preventive Care Visit  [facility]  HI Pitts CNP, Family Medicine  Aug 26, 2024      Assessment & Plan   Problem List Items Addressed This Visit       Generalized anxiety disorder     Current therapies include sertraline 100 mg daily.  She notes today that symptoms are relatively well-controlled.  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  We ultimately decided not to make any therapy adjustments at this time, however I have asked she follow up in the coming months especially if symptoms worsen with our planned change in antihypertensive management. Could consider addition of Wellbutrin or trial of different selective serotonin reuptake inhibitor. We had a good conversation as it pertains to lifestyle and mental health as well as stress management.          Essential hypertension     Patient's blood pressure today is above goal at 149/96.  Current therapies include metoprolol 25 mg XR daily.  She tolerates this medication well without significant side effects, however notes that her blood pressure has been elevated in multiple instances.  Chart review and her report suggest metoprolol was chosen for its anxiolytic properties.  I would suggest a trial of losartan in place of metoprolol she is amenable. Expected therapeutic effects and potential side effects were discussed today.  I have asked that she return for a nurse only blood pressure check in 3 to 4 weeks after starting this new therapy and she will need updated labs once control is achieved. Monitoring labs obtained today. If she has exacerbation of anxiety symptoms with this, we will reconnect to discuss.          Relevant Medications    losartan (COZAAR) 25 MG tablet    Other Relevant Orders    Comprehensive metabolic panel (BMP + Alb, Alk Phos, ALT, AST, Total. Bili, TP)    Benign neoplasm of descending colon     Patient is up-to-date on colon cancer screening.  She will need repeat colonoscopy in  "2029.         Routine general medical examination at a health care facility - Primary     Annual exam.  Mammogram: Ordered  PAP: UTD  Colonoscopy: UTD  Immunizations: UTD. Discussed seasonal immunizations.  Labs: Discussed and offered.  Mood: As documented.  BMI: 28.56. Discussed diet and exercise  Bone health: DEXA not indicated. Discussed bone health principles  Contraception:  with vasectomy  Tobacco/ETOH: No tobacco. Recommend reduction in ETOH consumption.  Recommend follow up in one year for annual exam or sooner if needed/indicated elsewhere.           Family history of osteoporosis        Patient has been advised of split billing requirements and indicates understanding: Yes       BMI  Estimated body mass index is 28.56 kg/m  as calculated from the following:    Height as of this encounter: 1.638 m (5' 4.5\").    Weight as of this encounter: 76.7 kg (169 lb).   Weight management plan: Discussed healthy diet and exercise guidelines    Counseling  Appropriate preventive services were addressed with this patient via screening, questionnaire, or discussion as appropriate for fall prevention, nutrition, physical activity, Tobacco-use cessation, social engagement, weight loss and cognition.  Checklist reviewing preventive services available has been given to the patient.  Reviewed patient's diet, addressing concerns and/or questions.   She is at risk for psychosocial distress and has been provided with information to reduce risk.   The patient reports drinking more than 3 alcoholic drinks per day and/or more than 7 drhnks per week. The patient was counseled and given information about possible harmful effects of excessive alcohol intake.      Audi Lubin is a 49 year old, presenting for the following:  Physical (Pt. Nonfasting.) and Establish Care        8/26/2024     1:30 PM   Additional Questions   Roomed by sac   Accompanied by self         8/26/2024     1:30 PM   Patient Reported Additional " Medications   Patient reports taking the following new medications no        Health Care Directive  Patient does not have a Health Care Directive or Living Will: Patient states has Advance Directive and will bring in a copy to clinic.    HPI  Patient is a very pleasant 49-year-old presenting today for her annual physical.  She has no concerns as a pertains to her health, but does note her blood pressure seems to be persistently elevated.        8/26/2024   General Health   How would you rate your overall physical health? Good   Feel stress (tense, anxious, or unable to sleep) Only a little      (!) STRESS CONCERN      8/26/2024   Nutrition   Three or more servings of calcium each day? Yes   Diet: Regular (no restrictions)   How many servings of fruit and vegetables per day? (!) 2-3   How many sweetened beverages each day? 0-1            8/26/2024   Exercise   Days per week of moderate/strenous exercise 4 days   Average minutes spent exercising at this level 30 min            8/26/2024   Social Factors   Frequency of gathering with friends or relatives Once a week   Worry food won't last until get money to buy more No   Food not last or not have enough money for food? No   Do you have housing? (Housing is defined as stable permanent housing and does not include staying ouside in a car, in a tent, in an abandoned building, in an overnight shelter, or couch-surfing.) Yes   Are you worried about losing your housing? No   Lack of transportation? No   Unable to get utilities (heat,electricity)? No            8/26/2024   Dental   Dentist two times every year? Yes            8/20/2024   TB Screening   Were you born outside of the [place]? No      Today's PHQ-9 Score:       6/20/2024     8:18 AM   PHQ-9 SCORE   PHQ-9 Total Score MyChart 0   PHQ-9 Total Score 0           8/26/2024   Substance Use   Alcohol more than 3/day or more than 7/wk Yes   How often do you have a drink containing alcohol 2 to 3 times a week   How many  alcohol drinks on typical day 1 or 2   How often do you have 5+ drinks at one occasion Less than monthly   Audit 2/3 Score 1   How often not able to stop drinking once started Never   How often failed to do what normally expected Never   How often needed first drink in am after a heavy drinking session Never   How often feeling of guilt or remorse after drinking Less than monthly   How often unable to remember what happened the night before Less than monthly   Have you or someone else been injured because of your drinking No   Has anyone been concerned or suggested you cut down on drinking No   TOTAL SCORE - AUDIT 6   Do you use any other substances recreationally? No        Social History     Tobacco Use     Smoking status: Never     Passive exposure: Past     Smokeless tobacco: Never   Vaping Use     Vaping status: Never Used   Substance Use Topics     Alcohol use: Yes     Alcohol/week: 21.0 standard drinks of alcohol     Drug use: No           1/5/2023   LAST FHS-7 RESULTS   1st degree relative breast or ovarian cancer Yes   Any relative bilateral breast cancer No   Any male have breast cancer No   Any ONE woman have BOTH breast AND ovarian cancer No   Any woman with breast cancer before 50yrs No   2 or more relatives with breast AND/OR ovarian cancer No   2 or more relatives with breast AND/OR bowel cancer No      Mammogram Screening - Mammogram every 1-2 years updated in Health Maintenance based on mutual decision making          8/26/2024   One time HIV Screening   Previous HIV test? No          8/26/2024   STI Screening   New sexual partner(s) since last STI/HIV test? No        History of abnormal Pap smear: No - age 30- 64 PAP with HPV every 5 years recommended        Latest Ref Rng & Units 1/9/2024     4:33 PM   PAP / HPV   PAP  Negative for Intraepithelial Lesion or Malignancy (NILM)    HPV 16 DNA Negative Negative    HPV 18 DNA Negative Negative    Other HR HPV Negative Negative      ASCVD Risk  "  The 10-year ASCVD risk score (Angelo CHAHAL, et al., 2019) is: 1%    Values used to calculate the score:      Age: 49 years      Sex: Female      Is Non- : No      Diabetic: No      Tobacco smoker: No      Systolic Blood Pressure: 149 mmHg      Is BP treated: Yes      HDL Cholesterol: 97 mg/dL      Total Cholesterol: 227 mg/dL        8/26/2024   Contraception/Family Planning   Questions about contraception or family planning No      Reviewed and updated as needed this visit by Provider   Tobacco  Allergies  Meds  Problems  Med Hx  Surg Hx  Fam Hx             Objective    Exam  BP (!) 149/96 (BP Location: Left arm, Patient Position: Sitting, Cuff Size: Adult Regular)   Pulse 60   Temp 98  F (36.7  C) (Oral)   Resp 16   Ht 1.638 m (5' 4.5\")   Wt 76.7 kg (169 lb)   LMP 08/03/2024 (Exact Date)   SpO2 98%   BMI 28.56 kg/m     Estimated body mass index is 28.56 kg/m  as calculated from the following:    Height as of this encounter: 1.638 m (5' 4.5\").    Weight as of this encounter: 76.7 kg (169 lb).    Physical Exam  GENERAL: alert and no distress  EYES: Eyes grossly normal to inspection, PERRL and conjunctivae and sclerae normal  HENT: ear canals and TM's normal, nose and mouth without ulcers or lesions  NECK: no adenopathy, no asymmetry, masses, or scars  RESP: lungs clear to auscultation - no rales, rhonchi or wheezes  CV: regular rate and rhythm, normal S1 S2, no S3 or S4, no murmur, click or rub, no peripheral edema  ABDOMEN: soft, nontender, no hepatosplenomegaly, no masses and bowel sounds normal  MS: no gross musculoskeletal defects noted, no edema  SKIN: no suspicious lesions or rashes  NEURO: Normal strength and tone, mentation intact and speech normal  PSYCH: mentation appears normal, affect normal/bright    Signed Electronically by: HI Pitts CNP    "

## 2024-08-26 NOTE — PATIENT INSTRUCTIONS
Patient Education   Preventive Care Advice   This is general advice given by our system to help you stay healthy. However, your care team may have specific advice just for you. Please talk to your care team about your preventive care needs.  Nutrition  Eat 5 or more servings of fruits and vegetables each day.  Try wheat bread, brown rice and whole grain pasta (instead of white bread, rice, and pasta).  Get enough calcium and vitamin D. Check the label on foods and aim for 100% of the RDA (recommended daily allowance).  Lifestyle  Exercise at least 150 minutes each week  (30 minutes a day, 5 days a week).  Do muscle strengthening activities 2 days a week. These help control your weight and prevent disease.  No smoking.  Wear sunscreen to prevent skin cancer.  Have a dental exam and cleaning every 6 months.  Yearly exams  See your health care team every year to talk about:  Any changes in your health.  Any medicines your care team has prescribed.  Preventive care, family planning, and ways to prevent chronic diseases.  Shots (vaccines)   HPV shots (up to age 26), if you've never had them before.  Hepatitis B shots (up to age 59), if you've never had them before.  COVID-19 shot: Get this shot when it's due.  Flu shot: Get a flu shot every year.  Tetanus shot: Get a tetanus shot every 10 years.  Pneumococcal, hepatitis A, and RSV shots: Ask your care team if you need these based on your risk.  Shingles shot (for age 50 and up)  General health tests  Diabetes screening:  Starting at age 35, Get screened for diabetes at least every 3 years.  If you are younger than age 35, ask your care team if you should be screened for diabetes.  Cholesterol test: At age 39, start having a cholesterol test every 5 years, or more often if advised.  Bone density scan (DEXA): At age 50, ask your care team if you should have this scan for osteoporosis (brittle bones).  Hepatitis C: Get tested at least once in your life.  STIs (sexually  transmitted infections)  Before age 24: Ask your care team if you should be screened for STIs.  After age 24: Get screened for STIs if you're at risk. You are at risk for STIs (including HIV) if:  You are sexually active with more than one person.  You don't use condoms every time.  You or a partner was diagnosed with a sexually transmitted infection.  If you are at risk for HIV, ask about PrEP medicine to prevent HIV.  Get tested for HIV at least once in your life, whether you are at risk for HIV or not.  Cancer screening tests  Cervical cancer screening: If you have a cervix, begin getting regular cervical cancer screening tests starting at age 21.  Breast cancer scan (mammogram): If you've ever had breasts, begin having regular mammograms starting at age 40. This is a scan to check for breast cancer.  Colon cancer screening: It is important to start screening for colon cancer at age 45.  Have a colonoscopy test every 10 years (or more often if you're at risk) Or, ask your provider about stool tests like a FIT test every year or Cologuard test every 3 years.  To learn more about your testing options, visit:   .  For help making a decision, visit:   https://bit.ly/tq76714.  Prostate cancer screening test: If you have a prostate, ask your care team if a prostate cancer screening test (PSA) at age 55 is right for you.  Lung cancer screening: If you are a current or former smoker ages 50 to 80, ask your care team if ongoing lung cancer screenings are right for you.  For informational purposes only. Not to replace the advice of your health care provider. Copyright   2023 [facility]. All rights reserved. Clinically reviewed by the [facility] Transitions Program. [employer] 195383 - REV 01/24.  9 Ways to Cut Back on Drinking  Maybe you've found yourself drinking more alcohol than you'd prefer. If you want to cut back, here are some ideas to try.    Think before you drink.  Do you really want a drink,  "or is it just a habit? If you're used to having a drink at a certain time, try doing something else then.     Look for substitutes.  Find some no-alcohol drinks that you enjoy, like flavored seltzer water, tea with honey, or tonic with a slice of lime. Or try alcohol-free beer or \"virgin\" cocktails (without the alcohol).     Drink more water.  Use water to quench your thirst. Drink a glass of water before you have any alcohol. Have another glass along with every drink or between drinks.     Shrink your drink.  For example, have a bottle of beer instead of a pint. Use a smaller glass for wine. Choose drinks with lower alcohol content (ABV%). Or use less liquor and more mixer in cocktails.     Slow down.  It's easy to drink quickly and without thinking about it. Pay attention, and make each drink last longer.     Do the math.  Total up how much you spend on alcohol each month. How much is that a year? If you cut back, what could you do with the money you save?     Take a break.  Choose a day or two each week when you won't drink at all. Notice how you feel on those days, physically and emotionally. How did you sleep? Do you feel better? Over time, add more break days.     Count calories.  Would you like to lose some weight? For some people that's a good motivator for cutting back. Figure out how many calories are in each drink. How many does that add up to in a day? In a week? In a month?     Practice saying no.  Be ready when someone offers you a drink. Try: \"Thanks, I've had enough.\" Or \"Thanks, but I'm cutting back.\" Or \"No, thanks. I feel better when I drink less.\"   Current as of: November 15, 2023  Content Version: 14.1 2006-2024 [employer].   Care instructions adapted under license by your healthcare professional. If you have questions about a medical condition or this instruction, always ask your healthcare professional. [employer] disclaims any warranty or liability for your use " of this information.

## 2024-10-31 ENCOUNTER — TELEPHONE (OUTPATIENT)
Dept: FAMILY MEDICINE | Facility: CLINIC | Age: 49
End: 2024-10-31

## 2024-10-31 NOTE — TELEPHONE ENCOUNTER
Patient Quality Outreach    Patient is due for the following:   Hypertension -  BP check    Next Steps:   No follow up needed at this time.    Type of outreach:    Sent [employer] message.      Questions for provider review:    None           Virginia Adrian MA

## 2025-02-27 ENCOUNTER — MYC MEDICAL ADVICE (OUTPATIENT)
Dept: FAMILY MEDICINE | Facility: CLINIC | Age: 50
End: 2025-02-27
Payer: COMMERCIAL

## 2025-02-27 DIAGNOSIS — I10 ESSENTIAL HYPERTENSION: ICD-10-CM

## 2025-02-27 RX ORDER — LOSARTAN POTASSIUM 25 MG/1
25 TABLET ORAL DAILY
Qty: 90 TABLET | Refills: 0 | Status: SHIPPED | OUTPATIENT
Start: 2025-02-27

## 2025-03-03 ENCOUNTER — TELEPHONE (OUTPATIENT)
Dept: FAMILY MEDICINE | Facility: CLINIC | Age: 50
End: 2025-03-03
Payer: COMMERCIAL

## 2025-03-03 DIAGNOSIS — I10 ESSENTIAL HYPERTENSION: ICD-10-CM

## 2025-03-03 RX ORDER — LOSARTAN POTASSIUM 25 MG/1
25 TABLET ORAL DAILY
Qty: 90 TABLET | Refills: 0 | Status: SHIPPED | OUTPATIENT
Start: 2025-03-03

## 2025-03-03 NOTE — TELEPHONE ENCOUNTER
General Call    Contacts       Contact Date/Time Type Contact Phone/Fax    03/03/2025 01:18 PM CST Phone (Incoming) Amee Nettles (Self) [phone] (M)          Reason for Call:  Prescription Request    What are your questions or concerns:  Patient calling to report pharmacy did not receive 2/27/25 prescription for losartan. Requesting call with verbal or that prescription be resent. Please advise.

## 2025-03-16 ENCOUNTER — HEALTH MAINTENANCE LETTER (OUTPATIENT)
Age: 50
End: 2025-03-16

## 2025-03-18 ENCOUNTER — HOSPITAL ENCOUNTER (EMERGENCY)
Facility: CLINIC | Age: 50
Discharge: HOME | End: 2025-03-18
Attending: EMERGENCY MEDICINE | Admitting: EMERGENCY MEDICINE
Payer: COMMERCIAL

## 2025-03-18 ENCOUNTER — MYC MEDICAL ADVICE (OUTPATIENT)
Dept: FAMILY MEDICINE | Facility: CLINIC | Age: 50
End: 2025-03-18
Payer: COMMERCIAL

## 2025-03-18 VITALS
SYSTOLIC BLOOD PRESSURE: 149 MMHG | HEART RATE: 64 BPM | DIASTOLIC BLOOD PRESSURE: 92 MMHG | TEMPERATURE: 97.8 F | RESPIRATION RATE: 25 BRPM | OXYGEN SATURATION: 97 %

## 2025-03-18 DIAGNOSIS — I10 HYPERTENSION, UNSPECIFIED TYPE: ICD-10-CM

## 2025-03-18 DIAGNOSIS — F43.0 ACUTE REACTION TO SITUATIONAL STRESS: ICD-10-CM

## 2025-03-18 DIAGNOSIS — R00.2 PALPITATIONS: ICD-10-CM

## 2025-03-18 LAB
ANION GAP SERPL CALCULATED.3IONS-SCNC: 12 MMOL/L (ref 7–15)
ATRIAL RATE - MUSE: 75 BPM
BUN SERPL-MCNC: 8.8 MG/DL (ref 6–20)
CALCIUM SERPL-MCNC: 9 MG/DL (ref 8.8–10.4)
CHLORIDE SERPL-SCNC: 104 MMOL/L (ref 98–107)
CREAT SERPL-MCNC: 0.86 MG/DL (ref 0.51–0.95)
DIASTOLIC BLOOD PRESSURE - MUSE: NORMAL MMHG
EGFRCR SERPLBLD CKD-EPI 2021: 83 ML/MIN/1.73M2
ERYTHROCYTE [DISTWIDTH] IN BLOOD BY AUTOMATED COUNT: 13.2 % (ref 10–15)
GLUCOSE SERPL-MCNC: 89 MG/DL (ref 70–99)
HCO3 SERPL-SCNC: 22 MMOL/L (ref 22–29)
HCT VFR BLD AUTO: 38.4 % (ref 35–47)
HGB BLD-MCNC: 13.1 G/DL (ref 11.7–15.7)
HOLD SPECIMEN: NORMAL
HOLD SPECIMEN: NORMAL
INTERPRETATION ECG - MUSE: NORMAL
MAGNESIUM SERPL-MCNC: 1.8 MG/DL (ref 1.7–2.3)
MCH RBC QN AUTO: 31.4 PG (ref 26.5–33)
MCHC RBC AUTO-ENTMCNC: 34.1 G/DL (ref 31.5–36.5)
MCV RBC AUTO: 92 FL (ref 78–100)
P AXIS - MUSE: 71 DEGREES
PLATELET # BLD AUTO: 176 10E3/UL (ref 150–450)
POTASSIUM SERPL-SCNC: 4 MMOL/L (ref 3.4–5.3)
PR INTERVAL - MUSE: 168 MS
QRS DURATION - MUSE: 74 MS
QT - MUSE: 426 MS
QTC - MUSE: 475 MS
R AXIS - MUSE: 15 DEGREES
RBC # BLD AUTO: 4.17 10E6/UL (ref 3.8–5.2)
SODIUM SERPL-SCNC: 138 MMOL/L (ref 135–145)
SYSTOLIC BLOOD PRESSURE - MUSE: NORMAL MMHG
T AXIS - MUSE: 12 DEGREES
VENTRICULAR RATE- MUSE: 75 BPM
WBC # BLD AUTO: 5.5 10E3/UL (ref 4–11)

## 2025-03-18 PROCEDURE — 80048 BASIC METABOLIC PNL TOTAL CA: CPT | Performed by: EMERGENCY MEDICINE

## 2025-03-18 PROCEDURE — 99284 EMERGENCY DEPT VISIT MOD MDM: CPT

## 2025-03-18 PROCEDURE — 93005 ELECTROCARDIOGRAM TRACING: CPT | Performed by: EMERGENCY MEDICINE

## 2025-03-18 PROCEDURE — 83735 ASSAY OF MAGNESIUM: CPT | Performed by: EMERGENCY MEDICINE

## 2025-03-18 PROCEDURE — 85018 HEMOGLOBIN: CPT | Performed by: EMERGENCY MEDICINE

## 2025-03-18 PROCEDURE — 84520 ASSAY OF UREA NITROGEN: CPT | Performed by: EMERGENCY MEDICINE

## 2025-03-18 PROCEDURE — 82565 ASSAY OF CREATININE: CPT | Performed by: EMERGENCY MEDICINE

## 2025-03-18 PROCEDURE — 36415 COLL VENOUS BLD VENIPUNCTURE: CPT | Performed by: EMERGENCY MEDICINE

## 2025-03-18 PROCEDURE — 85041 AUTOMATED RBC COUNT: CPT | Performed by: EMERGENCY MEDICINE

## 2025-03-18 RX ORDER — METOPROLOL TARTRATE 25 MG/1
25 TABLET, FILM COATED ORAL 2 TIMES DAILY
Qty: 60 TABLET | Refills: 0 | Status: SHIPPED | OUTPATIENT
Start: 2025-03-18 | End: 2025-04-17

## 2025-03-18 ASSESSMENT — ACTIVITIES OF DAILY LIVING (ADL)
ADLS_ACUITY_SCORE: 41
ADLS_ACUITY_SCORE: 41

## 2025-03-18 ASSESSMENT — COLUMBIA-SUICIDE SEVERITY RATING SCALE - C-SSRS
6. HAVE YOU EVER DONE ANYTHING, STARTED TO DO ANYTHING, OR PREPARED TO DO ANYTHING TO END YOUR LIFE?: NO
1. IN THE PAST MONTH, HAVE YOU WISHED YOU WERE DEAD OR WISHED YOU COULD GO TO SLEEP AND NOT WAKE UP?: NO
2. HAVE YOU ACTUALLY HAD ANY THOUGHTS OF KILLING YOURSELF IN THE PAST MONTH?: NO

## 2025-03-18 NOTE — ED TRIAGE NOTES
"Pt presents to the ED with chest pain and hypertension. Was at work when she developed \"pulsating\" behind her ears which prompted her to go see the school nurse. Concerned as they found her /90. Pt reports a burning sensation on L side of chest. Hx of HTN and on losartan. Endorses anxiety at work.     Triage Assessment (Adult)       Row Name 03/18/25 1153          Triage Assessment    Airway WDL WDL        Respiratory WDL    Respiratory WDL WDL        Skin Circulation/Temperature WDL    Skin Circulation/Temperature WDL WDL        Cardiac WDL    Cardiac WDL X;chest pain        Peripheral/Neurovascular WDL    Peripheral Neurovascular WDL WDL        Cognitive/Neuro/Behavioral WDL    Cognitive/Neuro/Behavioral WDL WDL                     "

## 2025-03-18 NOTE — ED PROVIDER NOTES
EMERGENCY DEPARTMENT ENCOUNTER      NAME: Amee Nettles  AGE: 49 year old female  YOB: 1975  MRN: [id number]  EVALUATION DATE & TIME: 3/18/2025 11:57 AM    PCP: Safia Carmona    ED PROVIDER: Venkat Pineda M.D.      Chief Complaint   Patient presents with    Hypertension    Chest Pain         FINAL IMPRESSION:  Palpitation  Hypertension    ED COURSE & MEDICAL DECISION MAKING:    Pertinent Labs & Imaging studies reviewed. (See chart for details)  49 year old female presents to the Emergency Department for evaluation of potation's, chest pressure, ear pressure.  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  Recently had blood pressure medicine switch from metoprolol to losartan.  Today while teaching having increasing symptoms.  Noted to have hypertension in nurses office and sent here.  Exam here reveals well-nourished follow-up female in no distress.  Blood pressure mildly elevated.  Neck supple.  Lungs clear.  Card exam unremarkable.  Neurologically intact.  Patient likely with simple stress reaction with secondary hypertension.  Did discuss restarting patient's metoprolol given its slight antianxiety effect.  Will obtain baseline blood work to assess for contributory electrolyte imbalance, anemia.. Patient appears non toxic with stable vitals signs. Overall exam is benign.    12:08 PM I met with the patient for the initial interview and physical examination. Discussed plan for treatment and workup in the ED.    1:15 PM.  Laboratory evaluation unremarkable.  Magnesium, electrolytes, CBC all normal.  Will continue outpatient plan with the metoprolol and discontinuation of the losartan.  At the conclusion of the encounter I discussed the results of all of the tests and the disposition. The questions were answered and return precautions provided. The patient or family acknowledged understanding and was agreeable with the care plan.         MEDICATIONS GIVEN IN THE  "EMERGENCY:  Medications - No data to display    NEW PRESCRIPTIONS STARTED AT TODAY'S ER VISIT  Current Discharge Medication List        START taking these medications    Details   metoprolol tartrate (LOPRESSOR) 25 MG tablet Take 1 tablet (25 mg) by mouth 2 times daily.  Qty: 60 tablet, Refills: 0                 =================================================================    HPI    Patient information was obtained from: patient     Use of Intrepreter: N/A         Amee Nettles is a 49 year old female with a pertient medical history of hypertension, generalized anxiety disorder, TMJ, who presents to the ED for evaluation of hypertension and chest pain.     The patient reports that she was teaching when she felt some jaw pain and pulsating behind her ears and around her head. This has been occurring on and off over the past week. She went to the school nurse and her blood pressure was \"off the charts\" at 150/90. Pt also endorses a \"burning\" sensation in the left side of her chest. She noted that last September she switched from 50 mg metoprolol to 25 mg losartan. She is on sertraline and drinks 1.5 cups of coffee normally.       REVIEW OF SYSTEMS     All systems negative except as marked.     PAST MEDICAL HISTORY:  Past Medical History:   Diagnosis Date    Hypertension Mid 40 s    Major depressive disorder, single episode, unspecified     Created by Conversion        PAST SURGICAL HISTORY:  No past surgical history on file.      CURRENT MEDICATIONS:    No current facility-administered medications for this encounter.    Current Outpatient Medications:     fluticasone propionate (FLONASE) 50 mcg/actuation nasal spray, [FLUTICASONE PROPIONATE (FLONASE) 50 MCG/ACTUATION NASAL SPRAY] SPRAY 1 SPRAY INTO EACH NOSTRIL EVERY DAY, Disp: 48 g, Rfl: 3    losartan (COZAAR) 25 MG tablet, Take 1 tablet (25 mg) by mouth daily., Disp: 90 tablet, Rfl: 0    sertraline (ZOLOFT) 100 MG tablet, Take 1 tablet (100 mg) by mouth daily, " Disp: 90 tablet, Rfl: 4    ALLERGIES:  No Known Allergies    FAMILY HISTORY:  Family History   Problem Relation Age of Onset    Hypertension Mother     Osteoporosis Mother     Hypertension Father     Pulmonary Embolism Father     Clotting Disorder Father     Hyperlipidemia Father     Cerebrovascular Disease Father     Anxiety Disorder Father     Other - See Comments Sister         palpitations    Anxiety Disorder Sister     Hypertension Sister     Heart Murmur Sister     Pulmonary Embolism Paternal Grandfather     Breast Cancer No family hx of     Diabetes No family hx of     Thyroid Disease No family hx of     Coronary Artery Disease Early Onset No family hx of        SOCIAL HISTORY:   Social History     Socioeconomic History    Marital status:    Tobacco Use    Smoking status: Never     Passive exposure: Past    Smokeless tobacco: Never   Vaping Use    Vaping status: Never Used   Substance and Sexual Activity    Alcohol use: Yes     Alcohol/week: 21.0 standard drinks of alcohol    Drug use: No    Sexual activity: Yes     Partners: Female     Birth control/protection: Condom   Other Topics Concern    Parent/sibling w/ CABG, MI or angioplasty before 65F 55M? No     Social Drivers of Health     Financial Resource Strain: Low Risk  (8/26/2024)    Financial Resource Strain     Within the past 12 months, have you or your family members you live with been unable to get utilities (heat, electricity) when it was really needed?: No   Food Insecurity: Low Risk  (8/26/2024)    Food Insecurity     Within the past 12 months, did you worry that your food would run out before you got money to buy more?: No     Within the past 12 months, did the food you bought just not last and you didn t have money to get more?: No   Transportation Needs: Low Risk  (8/26/2024)    Transportation Needs     Within the past 12 months, has lack of transportation kept you from medical appointments, getting your medicines, non-medical meetings  or appointments, work, or from getting things that you need?: No   Physical Activity: Insufficiently Active (8/26/2024)    Exercise Vital Sign     Days of Exercise per Week: 4 days     Minutes of Exercise per Session: 30 min   Stress: No Stress Concern Present (8/26/2024)    [employer] - Occupational Stress Questionnaire     Feeling of Stress : Only a little   Social Connections: Unknown (8/26/2024)    Social Connection and Isolation Panel [NHANES]     Frequency of Social Gatherings with Friends and Family: Once a week   Interpersonal Safety: Low Risk  (8/26/2024)    Interpersonal Safety     Do you feel physically and emotionally safe where you currently live?: Yes     Within the past 12 months, have you been hit, slapped, kicked or otherwise physically hurt by someone?: No     Within the past 12 months, have you been humiliated or emotionally abused in other ways by your partner or ex-partner?: No   Housing Stability: Low Risk  (8/26/2024)    Housing Stability     Do you have housing? : Yes     Are you worried about losing your housing?: No       VITALS:  Patient Vitals for the past 24 hrs:   BP Temp Temp src Pulse Resp SpO2   03/18/25 1147 (!) 164/104 97.8  F (36.6  C) Oral 75 18 99 %        PHYSICAL EXAM    Constitutional:  Awake, alert, in mild distress  HENT:  Normocephalic, Atraumatic. Bilateral external ears normal. Oropharynx moist. Nose normal. Neck- Normal range of motion with no guarding, Supple, No stridor.   Eyes:  PERRL, EOMI with no signs of entrapment, Conjunctiva normal, No discharge.   Respiratory:  Normal breath sounds, No respiratory distress, No wheezing.    Cardiovascular:  Normal heart rate, Normal rhythm, No appreciable rubs or gallops.   GI:  Soft, No tenderness, No distension, No palpable masses  Musculoskeletal:  No edema. Good range of motion in all major joints.   Integument:  Warm, Dry, No erythema, No rash.   Neurologic:  Alert & oriented, Normal motor  function, Normal sensory function, No focal deficits noted.   Psychiatric:  Affect normal, Judgment normal, Mood normal.     LAB:  All pertinent labs reviewed and interpreted.     Results for orders placed or performed during the hospital encounter of 03/18/25   Basic metabolic panel     Status: Normal   Result Value Ref Range    Sodium 138 135 - 145 mmol/L    Potassium 4.0 3.4 - 5.3 mmol/L    Chloride 104 98 - 107 mmol/L    Carbon Dioxide (CO2) 22 22 - 29 mmol/L    Anion Gap 12 7 - 15 mmol/L    Urea Nitrogen 8.8 6.0 - 20.0 mg/dL    Creatinine 0.86 0.51 - 0.95 mg/dL    GFR Estimate 83 >60 mL/min/1.73m2    Calcium 9.0 8.8 - 10.4 mg/dL    Glucose 89 70 - 99 mg/dL   Magnesium     Status: Normal   Result Value Ref Range    Magnesium 1.8 1.7 - 2.3 mg/dL   CBC (+ platelets, no diff)     Status: Normal   Result Value Ref Range    WBC Count 5.5 4.0 - 11.0 10e3/uL    RBC Count 4.17 3.80 - 5.20 10e6/uL    Hemoglobin 13.1 11.7 - 15.7 g/dL    Hematocrit 38.4 35.0 - 47.0 %    MCV 92 78 - 100 fL    MCH 31.4 26.5 - 33.0 pg    MCHC 34.1 31.5 - 36.5 g/dL    RDW 13.2 10.0 - 15.0 %    Platelet Count 176 150 - 450 10e3/uL   Extra Tube     Status: None (In process)    Narrative    The following orders were created for panel order Extra Tube.  Procedure                               Abnormality         Status                     ---------                               -----------         ------                     Extra Blue Top Tube[[id number]]                             In process                 Extra Red Top Tube[[id number]]                              In process                   Please view results for these tests on the individual orders.   ECG 12-LEAD WITH MUSE ([facility])     Status: None   Result Value Ref Range    Systolic Blood Pressure  mmHg    Diastolic Blood Pressure  mmHg    Ventricular Rate 75 BPM    Atrial Rate 75 BPM    NC Interval 168 ms    QRS Duration 74 ms     ms    QTc 475 ms    P Axis 71 degrees    R AXIS 15  degrees    T Axis 12 degrees    Interpretation ECG       Sinus rhythm  Nonspecific ST abnormality  Abnormal ECG  When compared with ECG of 11-Dec-2020 15:48,  No significant change was found  Confirmed by SEE ED PROVIDER NOTE FOR, ECG INTERPRETATION ([id number]),  JONATHAN MEJIA ([id number]) on 3/18/2025 12:53:17 PM        RADIOLOGY:  Reviewed all pertinent imaging. Please see official radiology report.  No orders to display       EKG:    Normal sinus rhythm.  Poor R wave progression in anterior leads.  Nonspecific ST segment flattening in inferior leads.  When compared to December 11, 2020 no significant changes.  I have independently reviewed and interpreted the EKG(s) documented above.      I, Jessica Holbrook, am serving as a scribe to document services personally performed by Venkat Pineda MD, based on my observation and the provider's statements to me. I, Venkat Pineda MD attest that Jessica Holbrook is acting in a scribe capacity, has observed my performance of the services and has documented them in accordance with my direction.    Venkat Pineda M.D.  Emergency Medicine  [facility] EMERGENCY ROOM     Venkat Pineda MD  03/18/25 [id number]

## 2025-04-07 ENCOUNTER — PATIENT OUTREACH (OUTPATIENT)
Dept: CARE COORDINATION | Facility: CLINIC | Age: 50
End: 2025-04-07
Payer: COMMERCIAL

## 2025-05-20 ENCOUNTER — ANCILLARY PROCEDURE (OUTPATIENT)
Dept: MAMMOGRAPHY | Facility: CLINIC | Age: 50
End: 2025-05-20
Payer: COMMERCIAL

## 2025-05-20 DIAGNOSIS — Z12.31 VISIT FOR SCREENING MAMMOGRAM: ICD-10-CM

## 2025-05-20 PROCEDURE — 77063 BREAST TOMOSYNTHESIS BI: CPT

## 2025-06-03 ENCOUNTER — OFFICE VISIT (OUTPATIENT)
Dept: FAMILY MEDICINE | Facility: CLINIC | Age: 50
End: 2025-06-03
Payer: COMMERCIAL

## 2025-06-03 VITALS
DIASTOLIC BLOOD PRESSURE: 84 MMHG | SYSTOLIC BLOOD PRESSURE: 120 MMHG | WEIGHT: 165.5 LBS | OXYGEN SATURATION: 98 % | HEIGHT: 65 IN | TEMPERATURE: 97.9 F | BODY MASS INDEX: 27.57 KG/M2 | HEART RATE: 74 BPM | RESPIRATION RATE: 16 BRPM

## 2025-06-03 DIAGNOSIS — I10 ESSENTIAL HYPERTENSION: Primary | ICD-10-CM

## 2025-06-03 LAB
ANION GAP SERPL CALCULATED.3IONS-SCNC: 14 MMOL/L (ref 7–15)
BUN SERPL-MCNC: 11.9 MG/DL (ref 6–20)
CALCIUM SERPL-MCNC: 9.2 MG/DL (ref 8.8–10.4)
CHLORIDE SERPL-SCNC: 106 MMOL/L (ref 98–107)
CREAT SERPL-MCNC: 0.89 MG/DL (ref 0.51–0.95)
EGFRCR SERPLBLD CKD-EPI 2021: 80 ML/MIN/1.73M2
GLUCOSE SERPL-MCNC: 83 MG/DL (ref 70–99)
HCO3 SERPL-SCNC: 20 MMOL/L (ref 22–29)
POTASSIUM SERPL-SCNC: 4.1 MMOL/L (ref 3.4–5.3)
SODIUM SERPL-SCNC: 140 MMOL/L (ref 135–145)

## 2025-06-03 PROCEDURE — 3079F DIAST BP 80-89 MM HG: CPT

## 2025-06-03 PROCEDURE — 36415 COLL VENOUS BLD VENIPUNCTURE: CPT

## 2025-06-03 PROCEDURE — 80048 BASIC METABOLIC PNL TOTAL CA: CPT

## 2025-06-03 PROCEDURE — 99213 OFFICE O/P EST LOW 20 MIN: CPT

## 2025-06-03 PROCEDURE — 3074F SYST BP LT 130 MM HG: CPT

## 2025-06-03 NOTE — PROGRESS NOTES
"  Assessment & Plan   Assessment & Plan  Essential hypertension  Blood pressure today is 120/84, indicating excellent control on current regimen of losartan 25 mg.  She feels much better than she did on metoprolol after our transition in April.  She has not had exacerbation of anxiety symptoms and does have as needed propranolol available in addition to her previously prescribed sertraline.  Updated BMP today given the new addition of an ARB medication.  No changes indicated nor desired today.  Orders:    Basic metabolic panel  (Ca, Cl, CO2, Creat, Gluc, K, Na, BUN); Future    Subjective   Amee is a 49 year old, presenting for the following health issues:  Follow Up (Losartan)        6/3/2025     8:56 AM   Additional Questions   Roomed by sac   Accompanied by self     Blood pressure check.  No specific concerns.  Finds these medications quite helpful and is not having the jaw/head symptoms that she was experiencing with metoprolol.    History of Present Illness       Hypertension: She presents for follow up of hypertension.  She does not check blood pressure  regularly outside of the clinic. Outside blood pressures have been over 140/90. She does not follow a low salt diet.     She eats 2-3 servings of fruits and vegetables daily.She consumes 1 sweetened beverage(s) daily.She exercises with enough effort to increase her heart rate 30 to 60 minutes per day.  She exercises with enough effort to increase her heart rate 4 days per week.   She is taking medications regularly.          Objective    /84 (BP Location: Left arm, Patient Position: Sitting, Cuff Size: Adult Large)   Pulse 74   Temp 97.9  F (36.6  C) (Oral)   Resp 16   Ht 1.638 m (5' 4.5\")   Wt 75.1 kg (165 lb 8 oz)   LMP 05/13/2025 (Exact Date)   SpO2 98%   BMI 27.97 kg/m    Body mass index is 27.97 kg/m .    Physical Exam  Vitals and nursing note reviewed.   Constitutional:       General: She is not in acute distress.     Appearance: Normal " appearance.   Cardiovascular:      Rate and Rhythm: Normal rate and regular rhythm.   Pulmonary:      Effort: Pulmonary effort is normal. No respiratory distress.   Neurological:      Mental Status: She is alert.   Psychiatric:         Mood and Affect: Mood normal.         Behavior: Behavior normal.         Thought Content: Thought content normal.            Signed Electronically by: HI Pitts CNP

## 2025-06-03 NOTE — ASSESSMENT & PLAN NOTE
Blood pressure today is 120/84, indicating excellent control on current regimen of losartan 25 mg.  She feels much better than she did on metoprolol after our transition in April.  She has not had exacerbation of anxiety symptoms and does have as needed propranolol available in addition to her previously prescribed sertraline.  Updated BMP today given the new addition of an ARB medication.  No changes indicated nor desired today.  Orders:    Basic metabolic panel  (Ca, Cl, CO2, Creat, Gluc, K, Na, BUN); Future

## 2025-06-04 ENCOUNTER — RESULTS FOLLOW-UP (OUTPATIENT)
Dept: FAMILY MEDICINE | Facility: CLINIC | Age: 50
End: 2025-06-04

## 2025-07-28 ENCOUNTER — PATIENT OUTREACH (OUTPATIENT)
Dept: CARE COORDINATION | Facility: CLINIC | Age: 50
End: 2025-07-28
Payer: COMMERCIAL

## 2025-08-11 ENCOUNTER — PATIENT OUTREACH (OUTPATIENT)
Dept: CARE COORDINATION | Facility: CLINIC | Age: 50
End: 2025-08-11
Payer: COMMERCIAL

## 2025-08-18 ENCOUNTER — MYC MEDICAL ADVICE (OUTPATIENT)
Dept: FAMILY MEDICINE | Facility: CLINIC | Age: 50
End: 2025-08-18
Payer: COMMERCIAL